# Patient Record
Sex: FEMALE | Race: WHITE | NOT HISPANIC OR LATINO
[De-identification: names, ages, dates, MRNs, and addresses within clinical notes are randomized per-mention and may not be internally consistent; named-entity substitution may affect disease eponyms.]

---

## 2017-01-19 ENCOUNTER — LABORATORY RESULT (OUTPATIENT)
Age: 57
End: 2017-01-19

## 2017-01-19 ENCOUNTER — APPOINTMENT (OUTPATIENT)
Dept: RHEUMATOLOGY | Facility: CLINIC | Age: 57
End: 2017-01-19

## 2017-01-20 ENCOUNTER — RX RENEWAL (OUTPATIENT)
Age: 57
End: 2017-01-20

## 2017-01-24 ENCOUNTER — APPOINTMENT (OUTPATIENT)
Dept: NEUROSURGERY | Facility: CLINIC | Age: 57
End: 2017-01-24

## 2017-01-26 ENCOUNTER — RX RENEWAL (OUTPATIENT)
Age: 57
End: 2017-01-26

## 2017-01-31 ENCOUNTER — APPOINTMENT (OUTPATIENT)
Dept: NEUROSURGERY | Facility: CLINIC | Age: 57
End: 2017-01-31

## 2017-01-31 VITALS
HEIGHT: 60 IN | SYSTOLIC BLOOD PRESSURE: 124 MMHG | WEIGHT: 136 LBS | BODY MASS INDEX: 26.7 KG/M2 | HEART RATE: 74 BPM | DIASTOLIC BLOOD PRESSURE: 77 MMHG

## 2017-02-02 ENCOUNTER — APPOINTMENT (OUTPATIENT)
Dept: RHEUMATOLOGY | Facility: CLINIC | Age: 57
End: 2017-02-02

## 2017-02-16 ENCOUNTER — RX RENEWAL (OUTPATIENT)
Age: 57
End: 2017-02-16

## 2017-03-02 ENCOUNTER — APPOINTMENT (OUTPATIENT)
Dept: RHEUMATOLOGY | Facility: CLINIC | Age: 57
End: 2017-03-02

## 2017-03-09 ENCOUNTER — RX RENEWAL (OUTPATIENT)
Age: 57
End: 2017-03-09

## 2017-03-27 ENCOUNTER — APPOINTMENT (OUTPATIENT)
Dept: RHEUMATOLOGY | Facility: CLINIC | Age: 57
End: 2017-03-27

## 2017-03-27 ENCOUNTER — LABORATORY RESULT (OUTPATIENT)
Age: 57
End: 2017-03-27

## 2017-03-27 VITALS
SYSTOLIC BLOOD PRESSURE: 108 MMHG | DIASTOLIC BLOOD PRESSURE: 77 MMHG | HEIGHT: 60 IN | TEMPERATURE: 98 F | OXYGEN SATURATION: 98 % | HEART RATE: 81 BPM | BODY MASS INDEX: 27.09 KG/M2 | WEIGHT: 138 LBS

## 2017-03-27 LAB
ALBUMIN SERPL ELPH-MCNC: 4.6 G/DL
ALP BLD-CCNC: 63 U/L
ALT SERPL-CCNC: 14 U/L
ANION GAP SERPL CALC-SCNC: 17 MMOL/L
APPEARANCE: CLEAR
AST SERPL-CCNC: 17 U/L
BASOPHILS # BLD AUTO: 0.02 K/UL
BASOPHILS NFR BLD AUTO: 0.3 %
BILIRUB SERPL-MCNC: 0.4 MG/DL
BILIRUBIN URINE: NEGATIVE
BLOOD URINE: ABNORMAL
BUN SERPL-MCNC: 16 MG/DL
CALCIUM SERPL-MCNC: 9.8 MG/DL
CHLORIDE SERPL-SCNC: 100 MMOL/L
CO2 SERPL-SCNC: 23 MMOL/L
COLOR: YELLOW
CREAT SERPL-MCNC: 0.89 MG/DL
CRP SERPL-MCNC: <0.2 MG/DL
EOSINOPHIL # BLD AUTO: 0.08 K/UL
EOSINOPHIL NFR BLD AUTO: 1.2 %
ERYTHROCYTE [SEDIMENTATION RATE] IN BLOOD BY WESTERGREN METHOD: 5 MM/HR
FOLATE SERPL-MCNC: >20 NG/ML
GLUCOSE QUALITATIVE U: NORMAL MG/DL
GLUCOSE SERPL-MCNC: 84 MG/DL
HCT VFR BLD CALC: 42.1 %
HGB BLD-MCNC: 14.6 G/DL
IMM GRANULOCYTES NFR BLD AUTO: 0.1 %
KETONES URINE: NEGATIVE
LEUKOCYTE ESTERASE URINE: ABNORMAL
LYMPHOCYTES # BLD AUTO: 3.3 K/UL
LYMPHOCYTES NFR BLD AUTO: 48.2 %
MAN DIFF?: NORMAL
MCHC RBC-ENTMCNC: 30.2 PG
MCHC RBC-ENTMCNC: 34.7 GM/DL
MCV RBC AUTO: 87.2 FL
MONOCYTES # BLD AUTO: 0.45 K/UL
MONOCYTES NFR BLD AUTO: 6.6 %
NEUTROPHILS # BLD AUTO: 2.98 K/UL
NEUTROPHILS NFR BLD AUTO: 43.6 %
NITRITE URINE: NEGATIVE
PH URINE: 7
PLATELET # BLD AUTO: 249 K/UL
POTASSIUM SERPL-SCNC: 4.4 MMOL/L
PROT SERPL-MCNC: 7.6 G/DL
PROTEIN URINE: NEGATIVE MG/DL
RBC # BLD: 4.83 M/UL
RBC # FLD: 12.5 %
SODIUM SERPL-SCNC: 140 MMOL/L
SPECIFIC GRAVITY URINE: 1.02
UROBILINOGEN URINE: NORMAL MG/DL
VIT B12 SERPL-MCNC: 390 PG/ML
WBC # FLD AUTO: 6.84 K/UL

## 2017-03-27 RX ORDER — METHYLPRED ACET/NACL,ISO-OS/PF 40 MG/ML
40 VIAL (ML) INJECTION
Qty: 1 | Refills: 0 | Status: COMPLETED | OUTPATIENT
Start: 2017-03-27

## 2017-03-27 RX ADMIN — METHYLPREDNISOLONE ACETATE 0 MG/ML: 40 INJECTION, SUSPENSION INTRA-ARTICULAR; INTRALESIONAL; INTRAMUSCULAR; SOFT TISSUE at 00:00

## 2017-03-31 ENCOUNTER — APPOINTMENT (OUTPATIENT)
Dept: ORTHOPEDIC SURGERY | Facility: CLINIC | Age: 57
End: 2017-03-31

## 2017-04-21 ENCOUNTER — APPOINTMENT (OUTPATIENT)
Dept: ORTHOPEDIC SURGERY | Facility: CLINIC | Age: 57
End: 2017-04-21

## 2017-04-27 ENCOUNTER — APPOINTMENT (OUTPATIENT)
Dept: RHEUMATOLOGY | Facility: CLINIC | Age: 57
End: 2017-04-27

## 2017-04-27 ENCOUNTER — RESULT REVIEW (OUTPATIENT)
Age: 57
End: 2017-04-27

## 2017-05-15 ENCOUNTER — APPOINTMENT (OUTPATIENT)
Dept: ORTHOPEDIC SURGERY | Facility: CLINIC | Age: 57
End: 2017-05-15

## 2017-05-22 ENCOUNTER — APPOINTMENT (OUTPATIENT)
Dept: RHEUMATOLOGY | Facility: CLINIC | Age: 57
End: 2017-05-22

## 2017-05-22 VITALS
DIASTOLIC BLOOD PRESSURE: 76 MMHG | TEMPERATURE: 97.9 F | BODY MASS INDEX: 27.09 KG/M2 | WEIGHT: 138 LBS | SYSTOLIC BLOOD PRESSURE: 114 MMHG | OXYGEN SATURATION: 98 % | HEIGHT: 60 IN | HEART RATE: 77 BPM

## 2017-05-24 ENCOUNTER — RX RENEWAL (OUTPATIENT)
Age: 57
End: 2017-05-24

## 2017-05-27 ENCOUNTER — APPOINTMENT (OUTPATIENT)
Dept: MRI IMAGING | Facility: CLINIC | Age: 57
End: 2017-05-27

## 2017-05-27 ENCOUNTER — OUTPATIENT (OUTPATIENT)
Dept: OUTPATIENT SERVICES | Facility: HOSPITAL | Age: 57
LOS: 1 days | End: 2017-05-27
Payer: COMMERCIAL

## 2017-05-27 DIAGNOSIS — K46.9 UNSPECIFIED ABDOMINAL HERNIA WITHOUT OBSTRUCTION OR GANGRENE: Chronic | ICD-10-CM

## 2017-05-27 DIAGNOSIS — D33.2 BENIGN NEOPLASM OF BRAIN, UNSPECIFIED: ICD-10-CM

## 2017-05-27 PROCEDURE — A9585: CPT

## 2017-05-27 PROCEDURE — 70553 MRI BRAIN STEM W/O & W/DYE: CPT

## 2017-06-04 ENCOUNTER — RX RENEWAL (OUTPATIENT)
Age: 57
End: 2017-06-04

## 2017-06-06 ENCOUNTER — APPOINTMENT (OUTPATIENT)
Dept: OTOLARYNGOLOGY | Facility: CLINIC | Age: 57
End: 2017-06-06

## 2017-06-29 ENCOUNTER — RX RENEWAL (OUTPATIENT)
Age: 57
End: 2017-06-29

## 2017-07-09 ENCOUNTER — RX RENEWAL (OUTPATIENT)
Age: 57
End: 2017-07-09

## 2017-07-13 ENCOUNTER — RX RENEWAL (OUTPATIENT)
Age: 57
End: 2017-07-13

## 2017-07-21 ENCOUNTER — APPOINTMENT (OUTPATIENT)
Dept: ORTHOPEDIC SURGERY | Facility: CLINIC | Age: 57
End: 2017-07-21

## 2017-07-21 VITALS
BODY MASS INDEX: 27.48 KG/M2 | DIASTOLIC BLOOD PRESSURE: 77 MMHG | SYSTOLIC BLOOD PRESSURE: 112 MMHG | WEIGHT: 140 LBS | HEIGHT: 60 IN | HEART RATE: 83 BPM

## 2017-07-21 DIAGNOSIS — S90.122A CONTUSION OF LEFT LESSER TOE(S) W/OUT DAMAGE TO NAIL, INITIAL ENCOUNTER: ICD-10-CM

## 2017-07-22 PROBLEM — S90.122A CONTUSION OF LESSER TOE OF LEFT FOOT WITHOUT DAMAGE TO NAIL, INITIAL ENCOUNTER: Status: ACTIVE | Noted: 2017-07-22

## 2017-08-08 ENCOUNTER — APPOINTMENT (OUTPATIENT)
Dept: NEUROSURGERY | Facility: CLINIC | Age: 57
End: 2017-08-08

## 2017-08-21 ENCOUNTER — APPOINTMENT (OUTPATIENT)
Dept: RHEUMATOLOGY | Facility: CLINIC | Age: 57
End: 2017-08-21

## 2017-08-31 ENCOUNTER — APPOINTMENT (OUTPATIENT)
Dept: OTOLARYNGOLOGY | Facility: CLINIC | Age: 57
End: 2017-08-31
Payer: COMMERCIAL

## 2017-08-31 VITALS
WEIGHT: 140 LBS | SYSTOLIC BLOOD PRESSURE: 117 MMHG | HEART RATE: 76 BPM | HEIGHT: 60 IN | DIASTOLIC BLOOD PRESSURE: 79 MMHG | BODY MASS INDEX: 27.48 KG/M2

## 2017-08-31 PROCEDURE — 31575 DIAGNOSTIC LARYNGOSCOPY: CPT

## 2017-08-31 PROCEDURE — 99214 OFFICE O/P EST MOD 30 MIN: CPT | Mod: 25

## 2017-09-01 ENCOUNTER — APPOINTMENT (OUTPATIENT)
Dept: ORTHOPEDIC SURGERY | Facility: CLINIC | Age: 57
End: 2017-09-01

## 2017-09-22 ENCOUNTER — APPOINTMENT (OUTPATIENT)
Dept: SPEECH THERAPY | Facility: CLINIC | Age: 57
End: 2017-09-22

## 2017-09-22 ENCOUNTER — OUTPATIENT (OUTPATIENT)
Dept: OUTPATIENT SERVICES | Facility: HOSPITAL | Age: 57
LOS: 1 days | Discharge: HOME | End: 2017-09-22

## 2017-09-22 DIAGNOSIS — K46.9 UNSPECIFIED ABDOMINAL HERNIA WITHOUT OBSTRUCTION OR GANGRENE: Chronic | ICD-10-CM

## 2017-10-03 ENCOUNTER — APPOINTMENT (OUTPATIENT)
Dept: SPEECH THERAPY | Facility: CLINIC | Age: 57
End: 2017-10-03

## 2017-10-07 ENCOUNTER — RX RENEWAL (OUTPATIENT)
Age: 57
End: 2017-10-07

## 2017-10-10 ENCOUNTER — APPOINTMENT (OUTPATIENT)
Dept: SPEECH THERAPY | Facility: CLINIC | Age: 57
End: 2017-10-10

## 2017-10-16 ENCOUNTER — APPOINTMENT (OUTPATIENT)
Dept: OTOLARYNGOLOGY | Facility: CLINIC | Age: 57
End: 2017-10-16
Payer: COMMERCIAL

## 2017-10-16 VITALS
SYSTOLIC BLOOD PRESSURE: 119 MMHG | WEIGHT: 140 LBS | HEART RATE: 73 BPM | HEIGHT: 60 IN | BODY MASS INDEX: 27.48 KG/M2 | DIASTOLIC BLOOD PRESSURE: 79 MMHG

## 2017-10-16 DIAGNOSIS — Z78.9 OTHER SPECIFIED HEALTH STATUS: ICD-10-CM

## 2017-10-16 DIAGNOSIS — R49.0 DYSPHONIA: ICD-10-CM

## 2017-10-16 DIAGNOSIS — R05 COUGH: ICD-10-CM

## 2017-10-16 PROCEDURE — 99214 OFFICE O/P EST MOD 30 MIN: CPT | Mod: 25

## 2017-10-16 PROCEDURE — 31579 LARYNGOSCOPY TELESCOPIC: CPT

## 2017-10-17 ENCOUNTER — APPOINTMENT (OUTPATIENT)
Dept: SPEECH THERAPY | Facility: CLINIC | Age: 57
End: 2017-10-17

## 2017-10-24 ENCOUNTER — APPOINTMENT (OUTPATIENT)
Dept: SPEECH THERAPY | Facility: CLINIC | Age: 57
End: 2017-10-24

## 2017-11-02 ENCOUNTER — RX RENEWAL (OUTPATIENT)
Age: 57
End: 2017-11-02

## 2017-11-02 DIAGNOSIS — R49.0 DYSPHONIA: ICD-10-CM

## 2017-11-07 ENCOUNTER — APPOINTMENT (OUTPATIENT)
Dept: SPEECH THERAPY | Facility: CLINIC | Age: 57
End: 2017-11-07

## 2017-11-20 ENCOUNTER — LABORATORY RESULT (OUTPATIENT)
Age: 57
End: 2017-11-20

## 2017-11-20 ENCOUNTER — APPOINTMENT (OUTPATIENT)
Dept: RHEUMATOLOGY | Facility: CLINIC | Age: 57
End: 2017-11-20
Payer: COMMERCIAL

## 2017-11-20 ENCOUNTER — APPOINTMENT (OUTPATIENT)
Dept: ENDOCRINOLOGY | Facility: CLINIC | Age: 57
End: 2017-11-20
Payer: COMMERCIAL

## 2017-11-20 VITALS — BODY MASS INDEX: 26.97 KG/M2 | HEIGHT: 60.5 IN | WEIGHT: 141 LBS

## 2017-11-20 PROCEDURE — 77080 DXA BONE DENSITY AXIAL: CPT

## 2017-11-20 PROCEDURE — 99214 OFFICE O/P EST MOD 30 MIN: CPT | Mod: 25

## 2017-11-20 PROCEDURE — 20610 DRAIN/INJ JOINT/BURSA W/O US: CPT | Mod: LT

## 2017-11-20 RX ORDER — TIZANIDINE HYDROCHLORIDE 4 MG/1
4 CAPSULE ORAL
Refills: 0 | Status: DISCONTINUED | COMMUNITY
End: 2017-11-20

## 2017-11-20 RX ORDER — METHYLPRED ACET/NACL,ISO-OS/PF 40 MG/ML
40 VIAL (ML) INJECTION
Qty: 1 | Refills: 0 | Status: COMPLETED | OUTPATIENT
Start: 2017-11-20

## 2017-11-20 RX ADMIN — METHYLPREDNISOLONE ACETATE 0 MG/ML: 40 INJECTION, SUSPENSION INTRA-ARTICULAR; INTRALESIONAL; INTRAMUSCULAR; SOFT TISSUE at 00:00

## 2017-11-21 ENCOUNTER — APPOINTMENT (OUTPATIENT)
Dept: SPEECH THERAPY | Facility: CLINIC | Age: 57
End: 2017-11-21

## 2017-11-24 LAB
25(OH)D3 SERPL-MCNC: 42.2 NG/ML
ADJUSTED MITOGEN: >10 IU/ML
ADJUSTED TB AG: 0.02 IU/ML
ALBUMIN SERPL ELPH-MCNC: 4.3 G/DL
ALP BLD-CCNC: 64 U/L
ALT SERPL-CCNC: 12 U/L
ANION GAP SERPL CALC-SCNC: 14 MMOL/L
APPEARANCE: ABNORMAL
AST SERPL-CCNC: 16 U/L
BASOPHILS # BLD AUTO: 0.03 K/UL
BASOPHILS NFR BLD AUTO: 0.5 %
BILIRUB SERPL-MCNC: 0.3 MG/DL
BILIRUBIN URINE: NEGATIVE
BLOOD URINE: ABNORMAL
BUN SERPL-MCNC: 15 MG/DL
CALCIUM SERPL-MCNC: 9.5 MG/DL
CALCIUM SERPL-MCNC: 9.5 MG/DL
CHLORIDE SERPL-SCNC: 102 MMOL/L
CHOLEST SERPL-MCNC: 203 MG/DL
CHOLEST/HDLC SERPL: 3.4 RATIO
CO2 SERPL-SCNC: 25 MMOL/L
COLOR: YELLOW
CREAT SERPL-MCNC: 0.83 MG/DL
CRP SERPL-MCNC: <0.2 MG/DL
EOSINOPHIL # BLD AUTO: 0.08 K/UL
EOSINOPHIL NFR BLD AUTO: 1.4 %
ERYTHROCYTE [SEDIMENTATION RATE] IN BLOOD BY WESTERGREN METHOD: 5 MM/HR
GLUCOSE QUALITATIVE U: NEGATIVE MG/DL
GLUCOSE SERPL-MCNC: 80 MG/DL
HBV SURFACE AB SER QL: NONREACTIVE
HBV SURFACE AB SERPL IA-ACNC: <3 MIU/ML
HBV SURFACE AG SER QL: NONREACTIVE
HCT VFR BLD CALC: 40.2 %
HCV AB SER QL: NONREACTIVE
HCV S/CO RATIO: 0.14 S/CO
HDLC SERPL-MCNC: 60 MG/DL
HGB BLD-MCNC: 14 G/DL
IMM GRANULOCYTES NFR BLD AUTO: 0.2 %
KETONES URINE: NEGATIVE
LDLC SERPL CALC-MCNC: 123 MG/DL
LEUKOCYTE ESTERASE URINE: NEGATIVE
LYMPHOCYTES # BLD AUTO: 3.01 K/UL
LYMPHOCYTES NFR BLD AUTO: 52.5 %
M TB IFN-G BLD-IMP: NEGATIVE
MAN DIFF?: NORMAL
MCHC RBC-ENTMCNC: 29.9 PG
MCHC RBC-ENTMCNC: 34.8 GM/DL
MCV RBC AUTO: 85.9 FL
MONOCYTES # BLD AUTO: 0.35 K/UL
MONOCYTES NFR BLD AUTO: 6.1 %
NEUTROPHILS # BLD AUTO: 2.25 K/UL
NEUTROPHILS NFR BLD AUTO: 39.3 %
NITRITE URINE: NEGATIVE
PARATHYROID HORMONE INTACT: 35 PG/ML
PH URINE: 7.5
PLATELET # BLD AUTO: 191 K/UL
POTASSIUM SERPL-SCNC: 3.9 MMOL/L
PROT SERPL-MCNC: 7.3 G/DL
PROTEIN URINE: NEGATIVE MG/DL
QUANTIFERON GOLD NIL: 0.06 IU/ML
RBC # BLD: 4.68 M/UL
RBC # FLD: 12.7 %
SODIUM SERPL-SCNC: 141 MMOL/L
SPECIFIC GRAVITY URINE: 1.02
TRIGL SERPL-MCNC: 100 MG/DL
TSH SERPL-ACNC: 0.71 UIU/ML
UROBILINOGEN URINE: NEGATIVE MG/DL
WBC # FLD AUTO: 5.73 K/UL

## 2017-11-30 RX ORDER — TIZANIDINE 4 MG/1
4 TABLET ORAL
Qty: 180 | Refills: 2 | Status: DISCONTINUED | COMMUNITY
Start: 2017-03-27 | End: 2017-11-30

## 2017-12-04 RX ORDER — GOLIMUMAB 100 MG/ML
100 INJECTION, SOLUTION SUBCUTANEOUS
Qty: 1 | Refills: 0 | Status: DISCONTINUED | COMMUNITY
Start: 2017-03-06 | End: 2017-12-04

## 2017-12-05 ENCOUNTER — APPOINTMENT (OUTPATIENT)
Dept: SPEECH THERAPY | Facility: CLINIC | Age: 57
End: 2017-12-05

## 2017-12-09 ENCOUNTER — APPOINTMENT (OUTPATIENT)
Dept: MRI IMAGING | Facility: CLINIC | Age: 57
End: 2017-12-09

## 2018-01-04 ENCOUNTER — APPOINTMENT (OUTPATIENT)
Dept: OTOLARYNGOLOGY | Facility: CLINIC | Age: 58
End: 2018-01-04

## 2018-02-15 ENCOUNTER — OTHER (OUTPATIENT)
Age: 58
End: 2018-02-15

## 2018-02-16 ENCOUNTER — RX RENEWAL (OUTPATIENT)
Age: 58
End: 2018-02-16

## 2018-02-19 ENCOUNTER — RX RENEWAL (OUTPATIENT)
Age: 58
End: 2018-02-19

## 2018-02-26 ENCOUNTER — APPOINTMENT (OUTPATIENT)
Dept: INTERNAL MEDICINE | Facility: CLINIC | Age: 58
End: 2018-02-26

## 2018-03-02 ENCOUNTER — FORM ENCOUNTER (OUTPATIENT)
Age: 58
End: 2018-03-02

## 2018-03-03 ENCOUNTER — APPOINTMENT (OUTPATIENT)
Dept: MRI IMAGING | Facility: CLINIC | Age: 58
End: 2018-03-03
Payer: COMMERCIAL

## 2018-03-03 ENCOUNTER — OUTPATIENT (OUTPATIENT)
Dept: OUTPATIENT SERVICES | Facility: HOSPITAL | Age: 58
LOS: 1 days | End: 2018-03-03
Payer: COMMERCIAL

## 2018-03-03 DIAGNOSIS — K46.9 UNSPECIFIED ABDOMINAL HERNIA WITHOUT OBSTRUCTION OR GANGRENE: Chronic | ICD-10-CM

## 2018-03-03 DIAGNOSIS — L40.50 ARTHROPATHIC PSORIASIS, UNSPECIFIED: ICD-10-CM

## 2018-03-03 PROCEDURE — 73721 MRI JNT OF LWR EXTRE W/O DYE: CPT | Mod: 26,LT

## 2018-03-03 PROCEDURE — 73721 MRI JNT OF LWR EXTRE W/O DYE: CPT

## 2018-03-03 PROCEDURE — A9585: CPT

## 2018-03-03 PROCEDURE — 70553 MRI BRAIN STEM W/O & W/DYE: CPT | Mod: 26

## 2018-03-03 PROCEDURE — 70553 MRI BRAIN STEM W/O & W/DYE: CPT

## 2018-03-06 ENCOUNTER — APPOINTMENT (OUTPATIENT)
Dept: NEUROSURGERY | Facility: CLINIC | Age: 58
End: 2018-03-06
Payer: COMMERCIAL

## 2018-03-06 VITALS
WEIGHT: 142 LBS | HEIGHT: 60.5 IN | BODY MASS INDEX: 27.16 KG/M2 | HEART RATE: 77 BPM | DIASTOLIC BLOOD PRESSURE: 87 MMHG | SYSTOLIC BLOOD PRESSURE: 130 MMHG

## 2018-03-06 PROCEDURE — 99212 OFFICE O/P EST SF 10 MIN: CPT

## 2018-03-12 ENCOUNTER — APPOINTMENT (OUTPATIENT)
Dept: RHEUMATOLOGY | Facility: CLINIC | Age: 58
End: 2018-03-12
Payer: COMMERCIAL

## 2018-03-14 ENCOUNTER — APPOINTMENT (OUTPATIENT)
Dept: RHEUMATOLOGY | Facility: CLINIC | Age: 58
End: 2018-03-14
Payer: COMMERCIAL

## 2018-03-14 DIAGNOSIS — Z87.39 PERSONAL HISTORY OF OTHER DISEASES OF THE MUSCULOSKELETAL SYSTEM AND CONNECTIVE TISSUE: ICD-10-CM

## 2018-03-14 PROCEDURE — 99215 OFFICE O/P EST HI 40 MIN: CPT

## 2018-03-14 RX ORDER — DICLOFENAC EPOLAMINE 0.01 G/1
1.3 SYSTEM TOPICAL TWICE DAILY
Qty: 60 | Refills: 2 | Status: DISCONTINUED | COMMUNITY
Start: 2017-03-27 | End: 2018-03-14

## 2018-03-14 RX ORDER — CLOTRIMAZOLE AND BETAMETHASONE DIPROPIONATE 10; .5 MG/G; MG/G
1-0.05 CREAM TOPICAL
Qty: 45 | Refills: 0 | Status: DISCONTINUED | COMMUNITY
Start: 2017-03-21 | End: 2018-03-14

## 2018-03-14 RX ORDER — FOLIC ACID 20 MG
CAPSULE ORAL
Refills: 0 | Status: DISCONTINUED | COMMUNITY
End: 2018-03-14

## 2018-03-14 RX ORDER — CIPROFLOXACIN HYDROCHLORIDE 500 MG/1
500 TABLET, FILM COATED ORAL
Qty: 6 | Refills: 0 | Status: DISCONTINUED | COMMUNITY
Start: 2017-06-13 | End: 2018-03-14

## 2018-03-14 RX ORDER — LEFLUNOMIDE 20 MG/1
20 TABLET, FILM COATED ORAL
Qty: 90 | Refills: 1 | Status: DISCONTINUED | COMMUNITY
Start: 2017-11-20 | End: 2018-03-14

## 2018-03-14 RX ORDER — LORATADINE 10 MG/1
10 TABLET ORAL
Qty: 20 | Refills: 0 | Status: DISCONTINUED | COMMUNITY
Start: 2017-07-17 | End: 2018-03-14

## 2018-03-14 RX ORDER — CYCLOBENZAPRINE HYDROCHLORIDE 10 MG/1
10 TABLET, FILM COATED ORAL
Qty: 60 | Refills: 0 | Status: DISCONTINUED | COMMUNITY
Start: 2017-03-21 | End: 2018-03-14

## 2018-03-14 RX ORDER — POLY-UREAURETHANE 16 %
NAIL FILM SOLUTION (ML) TOPICAL
Qty: 15 | Refills: 0 | Status: DISCONTINUED | COMMUNITY
Start: 2017-05-16 | End: 2018-03-14

## 2018-03-14 RX ORDER — LIDOCAINE AND PRILOCAINE 25; 25 MG/G; MG/G
2.5-2.5 CREAM TOPICAL
Refills: 0 | Status: DISCONTINUED | COMMUNITY
End: 2018-03-14

## 2018-03-14 RX ORDER — AMOXICILLIN 500 MG/1
500 CAPSULE ORAL
Qty: 21 | Refills: 0 | Status: DISCONTINUED | COMMUNITY
Start: 2017-02-12 | End: 2018-03-14

## 2018-03-16 ENCOUNTER — OTHER (OUTPATIENT)
Age: 58
End: 2018-03-16

## 2018-03-23 ENCOUNTER — RX RENEWAL (OUTPATIENT)
Age: 58
End: 2018-03-23

## 2018-03-27 ENCOUNTER — APPOINTMENT (OUTPATIENT)
Dept: MRI IMAGING | Facility: CLINIC | Age: 58
End: 2018-03-27
Payer: COMMERCIAL

## 2018-03-27 ENCOUNTER — OUTPATIENT (OUTPATIENT)
Dept: OUTPATIENT SERVICES | Facility: HOSPITAL | Age: 58
LOS: 1 days | End: 2018-03-27
Payer: COMMERCIAL

## 2018-03-27 ENCOUNTER — APPOINTMENT (OUTPATIENT)
Dept: RADIOLOGY | Facility: CLINIC | Age: 58
End: 2018-03-27
Payer: COMMERCIAL

## 2018-03-27 DIAGNOSIS — K46.9 UNSPECIFIED ABDOMINAL HERNIA WITHOUT OBSTRUCTION OR GANGRENE: Chronic | ICD-10-CM

## 2018-03-27 DIAGNOSIS — Z00.8 ENCOUNTER FOR OTHER GENERAL EXAMINATION: ICD-10-CM

## 2018-03-27 PROCEDURE — 72100 X-RAY EXAM L-S SPINE 2/3 VWS: CPT | Mod: 26

## 2018-03-27 PROCEDURE — 72148 MRI LUMBAR SPINE W/O DYE: CPT | Mod: 26

## 2018-03-27 PROCEDURE — 72100 X-RAY EXAM L-S SPINE 2/3 VWS: CPT

## 2018-03-27 PROCEDURE — 73522 X-RAY EXAM HIPS BI 3-4 VIEWS: CPT

## 2018-03-27 PROCEDURE — 72148 MRI LUMBAR SPINE W/O DYE: CPT

## 2018-03-27 PROCEDURE — 73522 X-RAY EXAM HIPS BI 3-4 VIEWS: CPT | Mod: 26

## 2018-04-12 ENCOUNTER — APPOINTMENT (OUTPATIENT)
Dept: ORTHOPEDIC SURGERY | Facility: CLINIC | Age: 58
End: 2018-04-12
Payer: COMMERCIAL

## 2018-04-12 VITALS
HEIGHT: 60 IN | SYSTOLIC BLOOD PRESSURE: 128 MMHG | DIASTOLIC BLOOD PRESSURE: 80 MMHG | BODY MASS INDEX: 27.48 KG/M2 | WEIGHT: 140 LBS | HEART RATE: 75 BPM

## 2018-04-12 DIAGNOSIS — M47.817 SPONDYLOSIS W/OUT MYELOPATHY OR RADICULOPATHY, LUMBOSACRAL REGION: ICD-10-CM

## 2018-04-12 PROCEDURE — 73562 X-RAY EXAM OF KNEE 3: CPT | Mod: RT

## 2018-04-12 PROCEDURE — 72100 X-RAY EXAM L-S SPINE 2/3 VWS: CPT

## 2018-04-12 PROCEDURE — 99214 OFFICE O/P EST MOD 30 MIN: CPT

## 2018-04-12 PROCEDURE — 72170 X-RAY EXAM OF PELVIS: CPT

## 2018-04-24 ENCOUNTER — APPOINTMENT (OUTPATIENT)
Dept: OTOLARYNGOLOGY | Facility: CLINIC | Age: 58
End: 2018-04-24
Payer: COMMERCIAL

## 2018-04-24 VITALS
HEART RATE: 71 BPM | BODY MASS INDEX: 27.48 KG/M2 | SYSTOLIC BLOOD PRESSURE: 123 MMHG | WEIGHT: 140 LBS | DIASTOLIC BLOOD PRESSURE: 78 MMHG | HEIGHT: 60 IN

## 2018-04-24 DIAGNOSIS — H61.893 OTHER SPECIFIED DISORDERS OF EXTERNAL EAR, BILATERAL: ICD-10-CM

## 2018-04-24 DIAGNOSIS — J38.3 OTHER DISEASES OF VOCAL CORDS: ICD-10-CM

## 2018-04-24 PROCEDURE — 31575 DIAGNOSTIC LARYNGOSCOPY: CPT

## 2018-04-24 PROCEDURE — 99214 OFFICE O/P EST MOD 30 MIN: CPT | Mod: 25

## 2018-04-25 ENCOUNTER — RX RENEWAL (OUTPATIENT)
Age: 58
End: 2018-04-25

## 2018-04-30 ENCOUNTER — APPOINTMENT (OUTPATIENT)
Dept: RHEUMATOLOGY | Facility: CLINIC | Age: 58
End: 2018-04-30

## 2018-05-02 ENCOUNTER — CLINICAL ADVICE (OUTPATIENT)
Age: 58
End: 2018-05-02

## 2018-05-03 ENCOUNTER — CLINICAL ADVICE (OUTPATIENT)
Age: 58
End: 2018-05-03

## 2018-05-03 ENCOUNTER — APPOINTMENT (OUTPATIENT)
Dept: ORTHOPEDIC SURGERY | Facility: CLINIC | Age: 58
End: 2018-05-03
Payer: COMMERCIAL

## 2018-05-03 PROCEDURE — 99214 OFFICE O/P EST MOD 30 MIN: CPT

## 2018-05-11 ENCOUNTER — RX RENEWAL (OUTPATIENT)
Age: 58
End: 2018-05-11

## 2018-05-11 ENCOUNTER — FORM ENCOUNTER (OUTPATIENT)
Age: 58
End: 2018-05-11

## 2018-05-12 ENCOUNTER — OUTPATIENT (OUTPATIENT)
Dept: OUTPATIENT SERVICES | Facility: HOSPITAL | Age: 58
LOS: 1 days | End: 2018-05-12
Payer: COMMERCIAL

## 2018-05-12 ENCOUNTER — APPOINTMENT (OUTPATIENT)
Dept: CT IMAGING | Facility: CLINIC | Age: 58
End: 2018-05-12
Payer: COMMERCIAL

## 2018-05-12 DIAGNOSIS — K14.6 GLOSSODYNIA: ICD-10-CM

## 2018-05-12 DIAGNOSIS — K46.9 UNSPECIFIED ABDOMINAL HERNIA WITHOUT OBSTRUCTION OR GANGRENE: Chronic | ICD-10-CM

## 2018-05-12 PROCEDURE — 70491 CT SOFT TISSUE NECK W/DYE: CPT | Mod: 26

## 2018-05-12 PROCEDURE — 70491 CT SOFT TISSUE NECK W/DYE: CPT

## 2018-05-30 ENCOUNTER — APPOINTMENT (OUTPATIENT)
Dept: OBGYN | Facility: CLINIC | Age: 58
End: 2018-05-30

## 2018-06-04 ENCOUNTER — APPOINTMENT (OUTPATIENT)
Dept: RHEUMATOLOGY | Facility: CLINIC | Age: 58
End: 2018-06-04

## 2018-06-04 VITALS
WEIGHT: 141 LBS | BODY MASS INDEX: 27.68 KG/M2 | HEART RATE: 70 BPM | SYSTOLIC BLOOD PRESSURE: 119 MMHG | DIASTOLIC BLOOD PRESSURE: 80 MMHG | TEMPERATURE: 98 F | HEIGHT: 60 IN | OXYGEN SATURATION: 98 %

## 2018-06-06 ENCOUNTER — RX RENEWAL (OUTPATIENT)
Age: 58
End: 2018-06-06

## 2018-06-12 ENCOUNTER — APPOINTMENT (OUTPATIENT)
Dept: RHEUMATOLOGY | Facility: CLINIC | Age: 58
End: 2018-06-12

## 2018-07-10 ENCOUNTER — APPOINTMENT (OUTPATIENT)
Dept: ULTRASOUND IMAGING | Facility: IMAGING CENTER | Age: 58
End: 2018-07-10

## 2018-07-10 ENCOUNTER — APPOINTMENT (OUTPATIENT)
Dept: OTOLARYNGOLOGY | Facility: CLINIC | Age: 58
End: 2018-07-10

## 2018-07-21 ENCOUNTER — APPOINTMENT (OUTPATIENT)
Dept: NEUROLOGY | Facility: CLINIC | Age: 58
End: 2018-07-21
Payer: COMMERCIAL

## 2018-07-21 VITALS
HEIGHT: 60 IN | DIASTOLIC BLOOD PRESSURE: 75 MMHG | SYSTOLIC BLOOD PRESSURE: 110 MMHG | BODY MASS INDEX: 27.68 KG/M2 | WEIGHT: 141 LBS | HEART RATE: 79 BPM

## 2018-07-21 PROCEDURE — 99244 OFF/OP CNSLTJ NEW/EST MOD 40: CPT

## 2018-07-21 RX ORDER — GARLIC EXTRACT 500 MG
CAPSULE ORAL
Qty: 30 | Refills: 0 | Status: DISCONTINUED | COMMUNITY
Start: 2018-04-24

## 2018-07-21 RX ORDER — ZINC SULFATE TAB 220 MG (50 MG ZINC EQUIVALENT) 220 (50 ZN) MG
220 (50 ZN) TAB ORAL
Qty: 30 | Refills: 0 | Status: DISCONTINUED | COMMUNITY
Start: 2018-04-25

## 2018-07-22 PROBLEM — R05 NON-PRODUCTIVE COUGH: Status: ACTIVE | Noted: 2017-10-16

## 2018-07-23 ENCOUNTER — APPOINTMENT (OUTPATIENT)
Dept: OPHTHALMOLOGY | Facility: CLINIC | Age: 58
End: 2018-07-23

## 2018-07-24 ENCOUNTER — RX RENEWAL (OUTPATIENT)
Age: 58
End: 2018-07-24

## 2018-07-26 ENCOUNTER — APPOINTMENT (OUTPATIENT)
Dept: RHEUMATOLOGY | Facility: CLINIC | Age: 58
End: 2018-07-26

## 2018-08-31 ENCOUNTER — APPOINTMENT (OUTPATIENT)
Dept: NEUROSURGERY | Facility: CLINIC | Age: 58
End: 2018-08-31

## 2018-09-25 ENCOUNTER — APPOINTMENT (OUTPATIENT)
Dept: OTOLARYNGOLOGY | Facility: CLINIC | Age: 58
End: 2018-09-25

## 2018-12-31 ENCOUNTER — OUTPATIENT (OUTPATIENT)
Dept: OUTPATIENT SERVICES | Facility: HOSPITAL | Age: 58
LOS: 1 days | End: 2018-12-31
Payer: COMMERCIAL

## 2018-12-31 ENCOUNTER — APPOINTMENT (OUTPATIENT)
Dept: ULTRASOUND IMAGING | Facility: CLINIC | Age: 58
End: 2018-12-31
Payer: COMMERCIAL

## 2018-12-31 DIAGNOSIS — K46.9 UNSPECIFIED ABDOMINAL HERNIA WITHOUT OBSTRUCTION OR GANGRENE: Chronic | ICD-10-CM

## 2018-12-31 DIAGNOSIS — R10.9 UNSPECIFIED ABDOMINAL PAIN: ICD-10-CM

## 2018-12-31 PROCEDURE — 76700 US EXAM ABDOM COMPLETE: CPT | Mod: 26

## 2018-12-31 PROCEDURE — 76700 US EXAM ABDOM COMPLETE: CPT

## 2019-01-25 ENCOUNTER — OTHER (OUTPATIENT)
Age: 59
End: 2019-01-25

## 2019-02-09 ENCOUNTER — APPOINTMENT (OUTPATIENT)
Dept: RADIOLOGY | Facility: CLINIC | Age: 59
End: 2019-02-09
Payer: COMMERCIAL

## 2019-02-09 ENCOUNTER — OUTPATIENT (OUTPATIENT)
Dept: OUTPATIENT SERVICES | Facility: HOSPITAL | Age: 59
LOS: 1 days | End: 2019-02-09
Payer: COMMERCIAL

## 2019-02-09 DIAGNOSIS — K46.9 UNSPECIFIED ABDOMINAL HERNIA WITHOUT OBSTRUCTION OR GANGRENE: Chronic | ICD-10-CM

## 2019-02-09 DIAGNOSIS — L40.50 ARTHROPATHIC PSORIASIS, UNSPECIFIED: ICD-10-CM

## 2019-02-09 PROCEDURE — 72110 X-RAY EXAM L-2 SPINE 4/>VWS: CPT | Mod: 26

## 2019-02-09 PROCEDURE — 73562 X-RAY EXAM OF KNEE 3: CPT | Mod: 26,50

## 2019-02-09 PROCEDURE — 72202 X-RAY EXAM SI JOINTS 3/> VWS: CPT | Mod: 26

## 2019-02-09 PROCEDURE — 72202 X-RAY EXAM SI JOINTS 3/> VWS: CPT

## 2019-02-09 PROCEDURE — 73562 X-RAY EXAM OF KNEE 3: CPT

## 2019-02-09 PROCEDURE — 72110 X-RAY EXAM L-2 SPINE 4/>VWS: CPT

## 2019-02-13 ENCOUNTER — OUTPATIENT (OUTPATIENT)
Dept: OUTPATIENT SERVICES | Facility: HOSPITAL | Age: 59
LOS: 1 days | End: 2019-02-13
Payer: COMMERCIAL

## 2019-02-13 ENCOUNTER — APPOINTMENT (OUTPATIENT)
Dept: MRI IMAGING | Facility: CLINIC | Age: 59
End: 2019-02-13
Payer: COMMERCIAL

## 2019-02-13 DIAGNOSIS — M17.0 BILATERAL PRIMARY OSTEOARTHRITIS OF KNEE: ICD-10-CM

## 2019-02-13 DIAGNOSIS — K46.9 UNSPECIFIED ABDOMINAL HERNIA WITHOUT OBSTRUCTION OR GANGRENE: Chronic | ICD-10-CM

## 2019-02-13 PROCEDURE — 73721 MRI JNT OF LWR EXTRE W/O DYE: CPT

## 2019-02-13 PROCEDURE — 73721 MRI JNT OF LWR EXTRE W/O DYE: CPT | Mod: 26,RT

## 2019-02-19 ENCOUNTER — APPOINTMENT (OUTPATIENT)
Dept: ORTHOPEDIC SURGERY | Facility: CLINIC | Age: 59
End: 2019-02-19
Payer: COMMERCIAL

## 2019-02-19 VITALS — BODY MASS INDEX: 27.48 KG/M2 | WEIGHT: 140 LBS | HEIGHT: 60 IN

## 2019-02-19 PROCEDURE — 73564 X-RAY EXAM KNEE 4 OR MORE: CPT | Mod: 50

## 2019-02-19 PROCEDURE — 99214 OFFICE O/P EST MOD 30 MIN: CPT

## 2019-02-19 NOTE — DISCUSSION/SUMMARY
[de-identified] : This patient has bilateral knee mild to moderate patellofemoral osteoarthritis with an acute on chronic flare of pain.  The patient is not an appropriate candidate for total knee arthroplasty at this time. An extensive discussion was conducted on the natural history of the disease and the variety of surgical and non-surgical options available to the patient including, but not limited to non-steroidal anti-inflammatory medications, steroid injections, physical therapy, maintenance of ideal body weight, and reduction of activity.  I recommended a course of Celebrex, physical therapy and neoprene sleeve knee braces.  I also gave her a prescription for a cane for offloading.  If she is not better in 4-6 weeks then she will return and we will consider an intra-articular cortisone injection.  The patient will schedule an appointment as needed.

## 2019-02-19 NOTE — PHYSICAL EXAM
[de-identified] : Patient is well nourished, well-developed, in no acute distress, with appropriate mood and affect. The patient is oriented to time, place, and person. Respirations are even and unlabored. Gait evaluation does reveal a limp. There is no inguinal adenopathy. Bilateral limbs are well-perfused, without skin lesions, shows a grossly normal motor and sensory examination. The right knee motion is significantly reduced and does cause significant pain. The right knee moves from 0-130 degrees. The knee is stable within that range-of-motion to AP and ML stress. The alignment of the knee is 5 degrees of valgus.  Positive patellar grind and patellofemoral compression testing.  Muscle strength is normal.  Pedal pulses are palpable. Hip examination was negative. The left knee motion is significantly reduced and does cause significant pain. The left knee moves from 0-130 degrees. The knee is stable within that range-of-motion to AP and ML stress. The alignment of the knee is 5 degrees valgus.  Positive patellar grind and patellofemoral compression testing.  Muscle strength is normal. Pedal pulses are palpable. Hip examination was negative. [de-identified] : Long standing knee, AP knee, lateral knee, and patellar views of the bilateral knee were ordered and taken in the office and demonstrate mild to moderate degenerative joint disease of the knee with joint space narrowing, osteophyte formation, and subchondral sclerosis.

## 2019-02-19 NOTE — HISTORY OF PRESENT ILLNESS
[de-identified] : This is very nice 58-year-old female experiencing bilateral anterior knee pain, which is severe in intensity.  She has had chronic pain for some time and her bilateral knees 2 days ago she twisted her knees while walking up and down the steps that she felt an increase in pain in the knees.  The pain substantially limits activities of daily living. Walking tolerance is reduced.  She denies any catching or clicking in the knees and the knees are not giving way on her.  She is not taking any medications for this.  She is not using a neoprene sleeve knee brace.  She is not using a cane or walker.  She has not doing physical therapy for this.  The pain does not radiate down her legs and it is not associated with numbness or tingling or weakness.

## 2019-02-19 NOTE — REASON FOR VISIT
[Initial Visit] : an initial visit for [Knee Pain] : knee pain [Osteoarthritis, Knee] : osteoarthritis, knee

## 2019-02-21 ENCOUNTER — APPOINTMENT (OUTPATIENT)
Dept: ENDOCRINOLOGY | Facility: CLINIC | Age: 59
End: 2019-02-21
Payer: COMMERCIAL

## 2019-02-21 VITALS
WEIGHT: 145 LBS | BODY MASS INDEX: 28.47 KG/M2 | SYSTOLIC BLOOD PRESSURE: 110 MMHG | OXYGEN SATURATION: 97 % | HEART RATE: 75 BPM | HEIGHT: 60 IN | DIASTOLIC BLOOD PRESSURE: 70 MMHG

## 2019-02-21 DIAGNOSIS — E04.1 NONTOXIC SINGLE THYROID NODULE: ICD-10-CM

## 2019-02-21 PROCEDURE — 77080 DXA BONE DENSITY AXIAL: CPT

## 2019-02-21 PROCEDURE — ZZZZZ: CPT

## 2019-02-21 PROCEDURE — 99244 OFF/OP CNSLTJ NEW/EST MOD 40: CPT | Mod: 25

## 2019-02-21 RX ORDER — SULFAMETHOXAZOLE AND TRIMETHOPRIM 800; 160 MG/1; MG/1
800-160 TABLET ORAL
Qty: 4 | Refills: 0 | Status: COMPLETED | COMMUNITY
Start: 2018-10-17

## 2019-02-21 RX ORDER — MUPIROCIN 20 MG/G
2 OINTMENT TOPICAL
Qty: 22 | Refills: 0 | Status: COMPLETED | COMMUNITY
Start: 2018-11-08

## 2019-02-21 RX ORDER — DENOSUMAB 60 MG/ML
60 INJECTION SUBCUTANEOUS
Qty: 1 | Refills: 0 | Status: DISCONTINUED | COMMUNITY
Start: 2018-04-06 | End: 2019-02-21

## 2019-02-21 RX ORDER — TOBRAMYCIN AND DEXAMETHASONE 3; 1 MG/ML; MG/ML
0.3-0.1 SUSPENSION/ DROPS OPHTHALMIC
Qty: 5 | Refills: 0 | Status: COMPLETED | COMMUNITY
Start: 2018-11-23

## 2019-02-21 RX ORDER — UREA 17 G/85G
20 CREAM TOPICAL
Qty: 85 | Refills: 0 | Status: COMPLETED | COMMUNITY
Start: 2018-10-17

## 2019-02-21 NOTE — REVIEW OF SYSTEMS
[Negative] : Heme/Lymph [Recent Weight Gain (___ Lbs)] : recent [unfilled] ~Ulb weight gain [Joint Pain] : joint pain [Myalgia] : myalgia

## 2019-02-22 NOTE — PHYSICAL EXAM
[Alert] : alert [No Acute Distress] : no acute distress [Well Nourished] : well nourished [Well Developed] : well developed [Normal Sclera/Conjunctiva] : normal sclera/conjunctiva [EOMI] : extra ocular movement intact [No Proptosis] : no proptosis [Normal Oropharynx] : the oropharynx was normal [No Respiratory Distress] : no respiratory distress [No Accessory Muscle Use] : no accessory muscle use [Clear to Auscultation] : lungs were clear to auscultation bilaterally [Normal Rate] : heart rate was normal  [Normal S1, S2] : normal S1 and S2 [Regular Rhythm] : with a regular rhythm [Normal Bowel Sounds] : normal bowel sounds [Not Tender] : non-tender [Soft] : abdomen soft [Not Distended] : not distended [Post Cervical Nodes] : posterior cervical nodes [Anterior Cervical Nodes] : anterior cervical nodes [Axillary Nodes] : axillary nodes [Normal] : normal and non tender [No Spinal Tenderness] : no spinal tenderness [Spine Straight] : spine straight [No Stigmata of Cushings Syndrome] : no stigmata of cushings syndrome [Normal Gait] : normal gait [Normal Strength/Tone] : muscle strength and tone were normal [No Rash] : no rash [Normal Reflexes] : deep tendon reflexes were 2+ and symmetric [No Tremors] : no tremors [Oriented x3] : oriented to person, place, and time [Acanthosis Nigricans] : no acanthosis nigricans [de-identified] : R thyroid nodule ~1 cm

## 2019-02-22 NOTE — CONSULT LETTER
[Consult Letter:] : I had the pleasure of evaluating your patient, [unfilled]. [Please see my note below.] : Please see my note below. [Consult Closing:] : Thank you very much for allowing me to participate in the care of this patient.  If you have any questions, please do not hesitate to contact me. [Sincerely,] : Sincerely, [Dear  ___] : Dear  [unfilled], [DrEli  ___] : Dr. CHAN [FreeTextEntry2] : Erik Iqbal MD\par [address] [place] [address]\[place] [address]

## 2019-02-22 NOTE — PROCEDURE
[FreeTextEntry1] : Bone mineral density: 02/21/2019 \par Indication: vs. 2017 \par Spine: -2.2 osteopenia (+8.0%)\par Total hip: -2.3 osteopenia (+4.9%)\par Femoral neck: -2.6 osteoporosis, no significant change \par Proximal radius: -1.3 osteopenia, no significant change \par

## 2019-02-22 NOTE — HISTORY OF PRESENT ILLNESS
[Prolia (Denosumab)] : Prolia (Denosumab) [Calcium (supplements)] : calcium from a dietary supplement [Taking Steroids] : a history of taking steroids [Family History of Osteoporosis] : family history of osteoporosis [Regular Dental Follow-Up] : regular dental follow-up [FreeTextEntry1] : Ms. NICK is a 58 year old female being referred today for osteoporosis. \par \par Pt was first told of low bone density ~2013. Pt was being treated by PCP. She previously took an oral bisphosphonates and had severe UGI sx. She had endoscopies, no ulcers found. BMD Nov 2017 vs 2014, spine: -2.8, osteoporosis ( -11.4%) Total hip: -2.5 osteoporosis (-9.6%) Fem neck: -2.8 osteoporosis (-6.8%) Proximal wrist: -1.1 osteopenia (-3.4%). Pt was then put on Prolia for 2 doses (last ~June 2018). She states on Prolia she has severe myalgias. No h/o fx. FHx of osteoporosis (mother and sister). Pt had hysterectomy at 42 due to fibroids and went through menopause ~ 52. No HRT. No hot flashes. She gets moderate amounts of dietary Ca and takes Ca. \par \par Of note, pt has fibromyalgia; previously on long term Prednisone for 6 mons in 2018. She then d/c and more recently restarted for her pain. c/o joint pain and pt can not walk well due to knee pain. Pt does not exercise. She states she recently gained wt despite no change in health habits.  \par \par Endocrine hx notable for R thyroid nodule, FNA biopsy benign with Dr. Shahriar Strauss. No dysphagia or neck pain. No sx of hyper or hypothyroidism.  [Low Calcium Intake] : no past or present history of low calcium intake [Low Vit D Intake/Exposure] : no past or present history of low vitamin D intake [High Fall Risk] : no fall risk [Frequent Falls] : no frequent falls [Uses a Walker/Cane] : no use of a walker/cane [Family History of Breast Cancer] : no family history of breast cancer [Family History of Hip Fracture] : no family history of hip fracture [Hyperparathyroidism] : no hyperparathyroidism [History of Radiation Therapy] : no history of radiation therapy [History of Blood Clots] : no history of blood clots [Previous Fragility Fracture] : no previous fragility fracture

## 2019-02-22 NOTE — ASSESSMENT
[Bisphosphonate Therapy] : Risks  and benefits of bisphosphonate therapy were  discussed with the patient including gastroesophageal irritation, osteonecrosis of the jaw, and atypical femur fractures, and acute phase reaction [FreeTextEntry1] : 58 year old female with osteoporosis. \par \par Pt has known of low bone density since ~ 2013. She was previously intolerant to oral bisphosphonates due to UGI sx. BMD 2017 decreased while on drug holiday. Pt then had 2 doses of Prolia (last ~June 2018). She did not tolerate due to severe myalgias. Of note, pt has a h/o fibromyalgia and still c/o severe joint pain and myalgias, it is unclear if her myalgias were truly related to the Prolia. Pt has previously been on long term steroids and more recently restarted Prednisone ~Feb 2019. Pt has no h/o fx. FHX of osteoporosis (sister and mother). Pt does not exercise and states she had wt gain without change in dietary habits. Endocrine hx is notable for thyroid nodule, previously benign on biopsy, pt has no clear sx of hyper or hypothyroidism. \par \par Repeat BMD 2/2019 indicates improvement in spine and tot hip, only fem neck consistent with osteoporosis (likely due to Prolia). Pt is at increase risk for future fx by d/c Prolia. First, I recommended no more than calcium 500 mg per day, vitamin D. 2000 units per day, in addition to dietary intake. I recommend pt start medical treatment to stop bone loss, increase BMD, and thus reduce risk of future fx. Due to GI irritation I would recommend IV Reclast. Risks and benefits discussed to include flu like sx on first dose of Reclast, long term risk of ONJ or atypical femur fx. With Reclast pt would be candidate for a drug holiday <5 years to decrease the future risk of these side effects. Recent research with Reclast looked at 18 month cycles of dosing instead of 12 months, with 6 year safety data, further there was reduction in CA. All questions answered. Pt understands and will call back with a decision. \par \par f/u TBD.

## 2019-02-22 NOTE — END OF VISIT
[FreeTextEntry3] : I, Nyla Ross, authored this note working as a medical scribe for Dr. Stephens.  02/21/2019. 11:00AM. \par This note was authored by Nyla Ross working as medical scribe for me. I have reviewed, edited, and revised the note as needed. I am in agreement with the exam findings, imaging findings, and treatment plan.  Sudeep Stephens MD

## 2019-03-01 ENCOUNTER — OUTPATIENT (OUTPATIENT)
Dept: OUTPATIENT SERVICES | Facility: HOSPITAL | Age: 59
LOS: 1 days | End: 2019-03-01
Payer: COMMERCIAL

## 2019-03-01 ENCOUNTER — FORM ENCOUNTER (OUTPATIENT)
Age: 59
End: 2019-03-01

## 2019-03-01 DIAGNOSIS — K46.9 UNSPECIFIED ABDOMINAL HERNIA WITHOUT OBSTRUCTION OR GANGRENE: Chronic | ICD-10-CM

## 2019-03-01 PROCEDURE — 78227 HEPATOBIL SYST IMAGE W/DRUG: CPT | Mod: 26

## 2019-03-01 PROCEDURE — 78227 HEPATOBIL SYST IMAGE W/DRUG: CPT

## 2019-03-01 PROCEDURE — A9537: CPT

## 2019-03-02 ENCOUNTER — OUTPATIENT (OUTPATIENT)
Dept: OUTPATIENT SERVICES | Facility: HOSPITAL | Age: 59
LOS: 1 days | End: 2019-03-02
Payer: COMMERCIAL

## 2019-03-02 ENCOUNTER — APPOINTMENT (OUTPATIENT)
Dept: MRI IMAGING | Facility: CLINIC | Age: 59
End: 2019-03-02
Payer: COMMERCIAL

## 2019-03-02 DIAGNOSIS — D32.9 BENIGN NEOPLASM OF MENINGES, UNSPECIFIED: ICD-10-CM

## 2019-03-02 DIAGNOSIS — K46.9 UNSPECIFIED ABDOMINAL HERNIA WITHOUT OBSTRUCTION OR GANGRENE: Chronic | ICD-10-CM

## 2019-03-02 PROCEDURE — A9585: CPT

## 2019-03-02 PROCEDURE — 70553 MRI BRAIN STEM W/O & W/DYE: CPT | Mod: 26

## 2019-03-02 PROCEDURE — 70553 MRI BRAIN STEM W/O & W/DYE: CPT

## 2019-03-14 ENCOUNTER — APPOINTMENT (OUTPATIENT)
Dept: ORTHOPEDIC SURGERY | Facility: CLINIC | Age: 59
End: 2019-03-14
Payer: COMMERCIAL

## 2019-03-14 VITALS — DIASTOLIC BLOOD PRESSURE: 78 MMHG | HEART RATE: 80 BPM | SYSTOLIC BLOOD PRESSURE: 122 MMHG

## 2019-03-14 VITALS — HEIGHT: 60 IN | WEIGHT: 143 LBS | BODY MASS INDEX: 28.07 KG/M2

## 2019-03-14 DIAGNOSIS — M23.303 OTHER MENISCUS DERANGEMENTS, UNSPECIFIED MEDIAL MENISCUS, RIGHT KNEE: ICD-10-CM

## 2019-03-14 PROCEDURE — 99213 OFFICE O/P EST LOW 20 MIN: CPT

## 2019-03-14 NOTE — HISTORY OF PRESENT ILLNESS
[Improving] : improving [7] : a current pain level of 7/10 [Walking] : walking [Standing] : standing [Constant] : ~He/She~ states the symptoms seem to be constant [Knee Flexion] : worsened with knee flexion [Knee Extension] : worsened with knee extension [de-identified] : Ms. SHAWNA NICK is a 58 year old female presents with worsening Right knee pain. She notes she had an injury to her knee in February, and then another injury right after seeing Dr. Fleming. She had an MRI of the right knee prior to seeing him in the office, and presents for reevaluation as her knee pain has not improved. She notes she had extreme swelling of the right knee after her second injury, which has slightly resolved since onset.  She has been taking Celebrex with some relief, but continued pain overall. \par She localizes the pain to the medial anterior aspect of the knee. She has pain with flexion and extension of the knee, and with ambulation.\par She has been doing physical therapy, with only very mild relief. She has had steroid injections in the past with failure. She would like to consider visco. \par

## 2019-03-14 NOTE — PHYSICAL EXAM
[Antalgic] : antalgic [de-identified] : On general examination the patient is adequately groomed and nourished. The vital parameters are as recorded. \par There is no lymphedema or diffuse swelling, no varicose veins, no skin warmth/erythema/scars/swelling, no ulcers and no palpable lymph nodes or masses in both lower extremities. Bilateral pedal pulses are well palpable.\par Upper Extremity:\par Both right and left upper extremities are unremarkable in terms of skin rash, lesions, pigmentation, redness, tenderness, swelling, joint instability, abnormal deformity or crepitus. The overall range of motion, sensation, motor tone and strength testing are normal.\par \par Bilateral Knee Exam: \par The gait is bilateral stiff knee antalgic.\par Knee alignment:            Right 5 degrees varus with no flexion deformity. \par Left 5 degrees varus with no flexion deformity.\par Both knees demonstrate no scars and the skin has no warmth, erythema, swelling or tenderness. \par Both knees have a range of motion of\par Extension:                    Right -5 degrees     Left 0 degrees\par Flexion:                                   Right 110 degrees      Left 110 degrees\par There is bilateral knee medial joint line tenderness. There is bilateral knee mild effusion. \par Diann's test is positive. Paresh test is positive.\par Lachman's test, Anterior/Posterior Drawer test and Pivot Shift Tests are negative. \par There is bilateral knee grade 1 MCL mediolateral laxity and no anteroposterior instability. \par Patella compression test is positive and patellofemoral tracking is normal with no lateral subluxation, apprehension or instability. \par Right knee quadriceps and hamstrings power is 4+.\par Left knee quadriceps and hamstrings power is 4+.\par \par Spine Exam:\par There is mild curvature of the spine with loss of normal lumbar lordosis. The skin is devoid of erythema, scars, pigmentation or rashes. There is mild lumbar spasm and tenderness especially at L4-L5 or L5-S1. \par The range of motion of the lumbar spine is limited by pain and spasm. Forward flexion is 80% normal, extension catch positive, lateral flexion and rotation 80% normal. There is no lumbar spine imbalance or instability detected.\par Bilateral passive SLR is right 40 degrees, left 40 degrees in supine and sitting positions. Lasegue's Test is negative.\par Neurology:\par The patient is alert and oriented in person, place and time. The mood is calm and affect is normal.\par Testing for coordination including Rhomberg's Test and Finger-Nose Test, sensation, motor tone and power and deep tendon reflexes in both lower extremities is normal.\par \par Hip Exam:\par The gait and station is normal\par The patient has equal leg lengths and no pelvic tilt. Yan/Meron test is 7 inches on the right and 7 inches on the left. Active SLR is 60 degrees on the right and 60 degrees on the left. Both hips demonstrate no scars and the skin has no signs of inflammation or tenderness. \par Both Hips have a normal range of motion of flexion to 100 degrees, abduction 40 degrees, adduction 20 degrees, external rotation 40 degrees, internal rotation 20 degrees with symmetrical motion in flexion and extension. There is no flexion contracture, deformity or instability. Labral impingement tests are negative.\par Both hips flexor, abductor and extensor power is normal.\par  [de-identified] : \par EXAM: MR KNEE RT \par \par \par PROCEDURE DATE: 02/13/2019 \par \par \par \par INTERPRETATION: \par MRI OF THE RIGHT KNEE \par \par CLINICAL INDICATION: Atraumatic right knee pain for 6 months. Predominantly \par posterior pain with trouble flexing knee. \par TECHNIQUE: Multiplanar, Multisequence MRI was obtained of the Right knee. \par COMPARISON: Bilateral knee radiographs from 2/9/2019. \par \par FINDINGS: \par \par CRUCIATE AND COLLATERAL LIGAMENTS: Trace amount of fluid adjacent to the \par medial collateral ligament (5, 15). Otherwise the ACL, PCL, MCL, and LCL \par complex are intact. \par \par MEDIAL COMPARTMENT: Obliquely oriented linear hyperintensity within the \par posterior horn consistent with meniscal tear (7, 19). Superficial and deep \par chondral fissuring. \par \par LATERAL COMPARTMENT: Meniscus is intact. Articular cartilage is preserved. \par \par PATELLOFEMORAL COMPARTMENT: Superior patellar osteophyte. Complete thickness \par cartilage loss along the lateral patellar facet measuring 1.9 x 2.8 cm with \par subjacent bone marrow edema (3, 12; 7, 12). Deep chondral fissuring along \par the median ridge and medial facet. Partial thickness chondral loss along the \par lateral facet of the trochlea with small bilateral osteophyte formation. \par \par EXTENSOR MECHANISM: Intact. \par \par SYNOVIUM/ JOINT FLUID: Physiologic amount of fluid within the joint space. \par Large Baker's cyst measuring 6.6 x 2.1 cm (7, 21) with internal septations. \par \par BONE MARROW: No acute fracture or dislocation. \par \par MUSCLES: No muscle atrophy or edema. \par \par NEUROVASCULAR STRUCTURES: Normal in course and caliber. \par \par SUBCUTANEOUS SOFT TISSUES: There is focal edema in the supralateral aspect \par of Hoffa fat. \par \par IMPRESSION: \par 1. Moderate tricompartmental osteoarthritis most prominent in the \par patellofemoral compartment. \par 2. Baker's cyst. \par 3. Small horizontal tear through the posterior of the medial meniscus. \par 4. Focal edema in the superolateral aspect of Hoffa fat, a finding seen in \par patellar tendon lateral femoral condyle friction syndrome. \par \par \par \par \par \par

## 2019-03-14 NOTE — DISCUSSION/SUMMARY
[de-identified] : Bilateral knee DJD long standing pain, with recent new onset of right knee pain and swelling, with medial meniscus tear confirmed on MRI\par The natural history and treatment of meniscus tears and DJD was discussed with the patient at length today. The spectrum of treatment including nonoperative modalities to surgical intervention was elucidated. Noninvasive and nonoperative treatment modalities include weight reduction, activity modification with low impact exercise,  as needed use of acetaminophen or anti-inflammatory medications if tolerated, glucosamine/chondroitin supplements, and physical therapy. Further treatments can include corticosteroid injection and the use of viscosupplementation with hyaluronic acid injections. Definitive surgical treatment can certainly include arthroscopy also. The risks and benefits of each treatment options was discussed and all questions were answered.\par The patient was informed of the findings and recommended conservative management in the form of a home exercise program, activity modifications, stationary bicycling, swimming and weight loss program. A trial of Glucosamine and Chondroiten Sulphate was recommended.\par A prescription for a course of physical therapy was provided for her to continue. \par She is currently taking celebrex, and will continue. Risks and benefits discussed. \par I have recommended Euflexxa injections to the bilateral knee and the patient agreed to proceed, and the risks, benefits and side effects were discussed.Follow-up appointment was recommended once the injection is received in the office to begin the series\par

## 2019-03-14 NOTE — CONSULT LETTER
[Dear  ___] : Dear  [unfilled], [Consult Letter:] : I had the pleasure of evaluating your patient, [unfilled]. [Please see my note below.] : Please see my note below. [Consult Closing:] : Thank you very much for allowing me to participate in the care of this patient.  If you have any questions, please do not hesitate to contact me. [Sincerely,] : Sincerely, [FreeTextEntry2] : PRUDENCIO MCDANIEL\par  [FreeTextEntry3] : Matt Oswald MD\par \par ______________________________________________\par [facility]: Hip/Knee Arthroplasty\par [address], [address], [place] [address]\par (t) [phone]\par (f) [phone]\par

## 2019-03-21 ENCOUNTER — RX RENEWAL (OUTPATIENT)
Age: 59
End: 2019-03-21

## 2019-03-25 ENCOUNTER — APPOINTMENT (OUTPATIENT)
Dept: RHEUMATOLOGY | Facility: CLINIC | Age: 59
End: 2019-03-25
Payer: COMMERCIAL

## 2019-03-25 PROCEDURE — 96374 THER/PROPH/DIAG INJ IV PUSH: CPT

## 2019-03-28 ENCOUNTER — APPOINTMENT (OUTPATIENT)
Dept: ORTHOPEDIC SURGERY | Facility: CLINIC | Age: 59
End: 2019-03-28
Payer: COMMERCIAL

## 2019-03-28 PROCEDURE — 20610 DRAIN/INJ JOINT/BURSA W/O US: CPT | Mod: 50

## 2019-03-28 NOTE — PROCEDURE
[Injection] : Injection [Bilateral] : of bilateral [Knee Joint] : knee joint [Osteoarthritis] : Osteoarthritis [Patient] : patient [Risk] : risk [Benefits] : benefits [Alternatives] : alternatives [Bleeding] : bleeding [Infection] : infection [Allergic Reaction] : allergic reaction [Verbal Consent Obtained] : verbal consent was obtained prior to the procedure [Betadine] : Betadine [Ethyl Chloride Spray] : ethyl chloride spray was used as a topical anesthetic [Lateral] : lateral [Anterior] : anterior [20] : a 20-gauge [2 mL Euflexxa___(lot #)] : 2mL Euflexxa ~Ulot# [unfilled] [Bandage Applied] : a bandage [Tolerated Well] : The patient tolerated the procedure well [None] : none [___ Week(s)] : in [unfilled] week(s) [de-identified] : Lot # [id number] 2/17/2020

## 2019-04-04 ENCOUNTER — APPOINTMENT (OUTPATIENT)
Dept: ORTHOPEDIC SURGERY | Facility: CLINIC | Age: 59
End: 2019-04-04
Payer: COMMERCIAL

## 2019-04-04 PROCEDURE — 20610 DRAIN/INJ JOINT/BURSA W/O US: CPT | Mod: 50

## 2019-04-04 NOTE — PROCEDURE
[Injection] : Injection [Bilateral] : of bilateral [Knee Joint] : knee joint [Osteoarthritis] : Osteoarthritis [Patient] : patient [Risk] : risk [Benefits] : benefits [Alternatives] : alternatives [Bleeding] : bleeding [Infection] : infection [Allergic Reaction] : allergic reaction [Verbal Consent Obtained] : verbal consent was obtained prior to the procedure [Betadine] : Betadine [Ethyl Chloride Spray] : ethyl chloride spray was used as a topical anesthetic [Lateral] : lateral [Anterior] : anterior [20] : a 20-gauge [2 mL Euflexxa___(lot #)] : 2mL Euflexxa ~Ulot# [unfilled] [Bandage Applied] : a bandage [Tolerated Well] : The patient tolerated the procedure well [None] : none [___ Week(s)] : in [unfilled] week(s) [de-identified] : Lot # [id number] 2/17/2020

## 2019-04-11 ENCOUNTER — APPOINTMENT (OUTPATIENT)
Dept: ORTHOPEDIC SURGERY | Facility: CLINIC | Age: 59
End: 2019-04-11
Payer: COMMERCIAL

## 2019-04-11 DIAGNOSIS — M17.0 BILATERAL PRIMARY OSTEOARTHRITIS OF KNEE: ICD-10-CM

## 2019-04-11 PROCEDURE — 20610 DRAIN/INJ JOINT/BURSA W/O US: CPT | Mod: RT

## 2019-04-11 NOTE — PROCEDURE
[Injection] : Injection [Knee Joint] : knee joint [Bilateral] : of bilateral [Patient] : patient [Osteoarthritis] : Osteoarthritis [Alternatives] : alternatives [Benefits] : benefits [Risk] : risk [Bleeding] : bleeding [Infection] : infection [Allergic Reaction] : allergic reaction [Verbal Consent Obtained] : verbal consent was obtained prior to the procedure [Betadine] : Betadine [Ethyl Chloride Spray] : ethyl chloride spray was used as a topical anesthetic [Lateral] : lateral [Anterior] : anterior [20] : a 20-gauge [2 mL Euflexxa___(lot #)] : 2mL Euflexxa ~Ulot# [unfilled] [Tolerated Well] : The patient tolerated the procedure well [Bandage Applied] : a bandage [None] : none [de-identified] : Lot # [id number] 2/17/2020 [___ Week(s)] : in [unfilled] week(s)

## 2019-05-01 ENCOUNTER — RX RENEWAL (OUTPATIENT)
Age: 59
End: 2019-05-01

## 2019-05-10 ENCOUNTER — APPOINTMENT (OUTPATIENT)
Dept: NEUROSURGERY | Facility: CLINIC | Age: 59
End: 2019-05-10

## 2019-05-14 ENCOUNTER — APPOINTMENT (OUTPATIENT)
Dept: ORTHOPEDIC SURGERY | Facility: CLINIC | Age: 59
End: 2019-05-14

## 2019-06-04 ENCOUNTER — APPOINTMENT (OUTPATIENT)
Dept: NEUROLOGY | Facility: CLINIC | Age: 59
End: 2019-06-04

## 2019-06-05 ENCOUNTER — APPOINTMENT (OUTPATIENT)
Dept: PHYSICAL MEDICINE AND REHAB | Facility: CLINIC | Age: 59
End: 2019-06-05

## 2019-07-02 ENCOUNTER — APPOINTMENT (OUTPATIENT)
Dept: NEUROSURGERY | Facility: CLINIC | Age: 59
End: 2019-07-02

## 2019-09-07 ENCOUNTER — OUTPATIENT (OUTPATIENT)
Dept: OUTPATIENT SERVICES | Facility: HOSPITAL | Age: 59
LOS: 1 days | End: 2019-09-07
Payer: COMMERCIAL

## 2019-09-07 ENCOUNTER — APPOINTMENT (OUTPATIENT)
Dept: CT IMAGING | Facility: CLINIC | Age: 59
End: 2019-09-07
Payer: COMMERCIAL

## 2019-09-07 DIAGNOSIS — R31.29 OTHER MICROSCOPIC HEMATURIA: ICD-10-CM

## 2019-09-07 DIAGNOSIS — K46.9 UNSPECIFIED ABDOMINAL HERNIA WITHOUT OBSTRUCTION OR GANGRENE: Chronic | ICD-10-CM

## 2019-09-07 PROCEDURE — 74178 CT ABD&PLV WO CNTR FLWD CNTR: CPT | Mod: 26

## 2019-09-07 PROCEDURE — 74178 CT ABD&PLV WO CNTR FLWD CNTR: CPT

## 2019-09-27 ENCOUNTER — RX RENEWAL (OUTPATIENT)
Age: 59
End: 2019-09-27

## 2019-10-08 ENCOUNTER — APPOINTMENT (OUTPATIENT)
Dept: ORTHOPEDIC SURGERY | Facility: CLINIC | Age: 59
End: 2019-10-08
Payer: COMMERCIAL

## 2019-10-08 VITALS — HEIGHT: 60 IN | WEIGHT: 143 LBS | BODY MASS INDEX: 28.07 KG/M2

## 2019-10-08 DIAGNOSIS — G89.29 PAIN IN LEFT KNEE: ICD-10-CM

## 2019-10-08 DIAGNOSIS — M25.562 PAIN IN LEFT KNEE: ICD-10-CM

## 2019-10-08 PROCEDURE — 99214 OFFICE O/P EST MOD 30 MIN: CPT

## 2019-10-08 PROCEDURE — 73564 X-RAY EXAM KNEE 4 OR MORE: CPT | Mod: 50

## 2019-10-08 NOTE — DISCUSSION/SUMMARY
[de-identified] : This patient is 3-month status post right total knee arthroplasty and she also has mild left knee osteoarthritis in the patellofemoral compartment.  I had a prolonged discussion with the patient regarding the fact that she is only 3 months from her total knee arthroplasty and she needs to give herself more time to recover.  She has no evidence of any complication on exam today.  I agree with the other surgeons opinion that she needs to continue with physical therapy.  Anti-inflammatories are recommended if she is a candidate.  She is at risk for having chronic prolonged pain and a prolonged recovery because she is a chronic pain patient has been taking narcotics preoperatively she tells me she also has a history of fibromyalgia.  She states that she really wants me to prescribe her more narcotics but I told her that this would be inappropriate for me to do so since she just had surgery with another surgeon and also she has a pain management doctor that manages her narcotics.  Continue to be weightbearing as tolerated.  She is recommended to follow-up with Dr. Ruvalcaba going forward.

## 2019-10-08 NOTE — PHYSICAL EXAM
[de-identified] : Patient is well nourished, well-developed, in no acute distress, with appropriate mood and affect. The patient is oriented to time, place, and person. Respirations are even and unlabored. Gait evaluation does reveal a limp. There is no inguinal adenopathy. Bilateral limbs are well-perfused, without skin lesions, shows a grossly normal motor and sensory examination. The right knee motion is significantly reduced and does cause significant pain. The right knee moves from 0-125 degrees. The knee is stable within that range-of-motion to AP and ML stress. The alignment of the knee is neutral.  Muscle strength is normal.  She is a well-healed midline surgical scar.  No effusion.  Pedal pulses are palpable. Hip examination was negative. The left knee motion is not significantly reduced and does cause mild pain. The left knee moves from 0-135 degrees. The knee is stable within that range-of-motion to AP and ML stress. The alignment of the knee is 5 degrees varus.  Negative patellar grind.  Muscle strength is normal. Pedal pulses are palpable. Hip examination was negative. [de-identified] : AP and lateral knee, sunrise and tunnel x-rays of the right knee were reviewed. Satisfactory position and alignment of the components are present. No signs of loosening are seen.\par \par Long standing knee, AP knee, lateral knee, and patellar views of the left knee were ordered and taken in the office and demonstrate mild degenerative joint disease of the patellofemoral joint of the knee with joint space narrowing, osteophyte formation, and subchondral sclerosis.

## 2019-10-08 NOTE — HISTORY OF PRESENT ILLNESS
[de-identified] : Is a 59-year-old female who presents with bilateral knee pain.  Here for a second opinion.  I previously evaluated her 7 months ago for bilateral anterior knee pain and diagnosed with mild to moderate osteoarthritis in the patellofemoral joint.  She also saw Dr. Oswald for this diagnosis in both he and I did not recommend surgery at that time.  Instead she went to Dr. Ruvalcaba at the [facility] for special surgery who performed a right total knee arthroplasty.  She says that the wound healed well but she has not recovered in terms of her pain since the surgery.  She does not have any more pain than before surgery but her pain is not changed.  She says that she is still ambulating with a cane.  Her surgery was on June 25, 2019 which is only 3 months ago.  She feels like she is recovering slowly.  She does have a history of being on chronic pain medications and is currently taking tramadol and gabapentin.  She also sees pain management who told her that she needs to see a psychiatrist for "pain medication seeking behavior"according to the patient.  She also states that she has a history of right lower extremity radiculopathy and has multiple spine injections in the past which do not help.  She has been working physical therapy.  She denies any fevers or chills.  Her knee is not giving way.  She is not taking anti-inflammatory medications because she does not like how her body reacts to these medications.  She does not use any brace.  She has been seeing her surgeon who told her that she just needs more time to recover to continue with physical therapy.

## 2019-11-15 ENCOUNTER — APPOINTMENT (OUTPATIENT)
Dept: ORTHOPEDIC SURGERY | Facility: CLINIC | Age: 59
End: 2019-11-15

## 2020-02-04 ENCOUNTER — APPOINTMENT (OUTPATIENT)
Dept: ORTHOPEDIC SURGERY | Facility: CLINIC | Age: 60
End: 2020-02-04

## 2020-02-04 ENCOUNTER — APPOINTMENT (OUTPATIENT)
Dept: PAIN MANAGEMENT | Facility: CLINIC | Age: 60
End: 2020-02-04

## 2020-02-04 ENCOUNTER — APPOINTMENT (OUTPATIENT)
Dept: RHEUMATOLOGY | Facility: CLINIC | Age: 60
End: 2020-02-04
Payer: COMMERCIAL

## 2020-02-04 ENCOUNTER — APPOINTMENT (OUTPATIENT)
Dept: ENDOCRINOLOGY | Facility: CLINIC | Age: 60
End: 2020-02-04
Payer: COMMERCIAL

## 2020-02-04 VITALS
SYSTOLIC BLOOD PRESSURE: 123 MMHG | DIASTOLIC BLOOD PRESSURE: 75 MMHG | HEART RATE: 73 BPM | TEMPERATURE: 98 F | WEIGHT: 134 LBS | HEIGHT: 60 IN | RESPIRATION RATE: 16 BRPM | BODY MASS INDEX: 26.31 KG/M2 | OXYGEN SATURATION: 98 %

## 2020-02-04 VITALS
OXYGEN SATURATION: 96 % | HEART RATE: 80 BPM | SYSTOLIC BLOOD PRESSURE: 122 MMHG | HEIGHT: 60 IN | WEIGHT: 134 LBS | DIASTOLIC BLOOD PRESSURE: 80 MMHG | BODY MASS INDEX: 26.31 KG/M2

## 2020-02-04 PROCEDURE — 99214 OFFICE O/P EST MOD 30 MIN: CPT | Mod: 25

## 2020-02-04 PROCEDURE — ZZZZZ: CPT

## 2020-02-04 PROCEDURE — 77080 DXA BONE DENSITY AXIAL: CPT

## 2020-02-04 PROCEDURE — 99214 OFFICE O/P EST MOD 30 MIN: CPT

## 2020-02-04 RX ORDER — CELECOXIB 100 MG/1
100 CAPSULE ORAL
Qty: 60 | Refills: 0 | Status: DISCONTINUED | COMMUNITY
Start: 2019-02-19 | End: 2020-02-04

## 2020-02-04 RX ORDER — PHENAZOPYRIDINE HYDROCHLORIDE 200 MG/1
200 TABLET ORAL
Qty: 6 | Refills: 0 | Status: DISCONTINUED | COMMUNITY
Start: 2017-06-06 | End: 2020-02-04

## 2020-02-04 RX ORDER — DIPHENHYDRAMINE HYDROCHLORIDE AND LIDOCAINE HYDROCHLORIDE AND ALUMINUM HYDROXIDE AND MAGNESIUM HYDRO
KIT
Qty: 237 | Refills: 0 | Status: DISCONTINUED | COMMUNITY
Start: 2018-02-20 | End: 2020-02-04

## 2020-02-04 RX ORDER — AMITRIPTYLINE HYDROCHLORIDE 10 MG/1
10 TABLET, FILM COATED ORAL
Qty: 60 | Refills: 2 | Status: DISCONTINUED | COMMUNITY
Start: 2018-07-21 | End: 2020-02-04

## 2020-02-04 RX ORDER — TRAMADOL HYDROCHLORIDE 50 MG/1
50 TABLET, COATED ORAL 3 TIMES DAILY
Qty: 60 | Refills: 0 | Status: DISCONTINUED | COMMUNITY
Start: 2019-03-21 | End: 2020-02-04

## 2020-02-04 RX ORDER — HYALURONATE SODIUM 20 MG/2 ML
20 SYRINGE (ML) INTRAARTICULAR
Qty: 2 | Refills: 0 | Status: DISCONTINUED | OUTPATIENT
Start: 2019-03-14 | End: 2020-02-04

## 2020-02-04 RX ORDER — GLUC/MSM/COLGN2/HYAL/ANTIARTH3 375-375-20
240 (27 FE) TABLET ORAL DAILY
Qty: 30 | Refills: 0 | Status: DISCONTINUED | COMMUNITY
Start: 2018-04-24 | End: 2020-02-04

## 2020-02-04 RX ORDER — CELECOXIB 100 MG/1
100 CAPSULE ORAL
Qty: 60 | Refills: 0 | Status: DISCONTINUED | COMMUNITY
Start: 2019-03-18 | End: 2020-02-04

## 2020-02-04 RX ORDER — PREDNISONE 5 MG/1
5 TABLET ORAL
Qty: 360 | Refills: 0 | Status: DISCONTINUED | COMMUNITY
Start: 2017-11-20 | End: 2020-02-04

## 2020-02-04 RX ORDER — GABAPENTIN 100 MG/1
100 CAPSULE ORAL 3 TIMES DAILY
Qty: 90 | Refills: 0 | Status: DISCONTINUED | COMMUNITY
Start: 2019-04-04 | End: 2020-02-04

## 2020-02-04 RX ORDER — COLD-HOT PACK
125 MCG EACH MISCELLANEOUS
Refills: 0 | Status: ACTIVE | COMMUNITY

## 2020-02-04 RX ORDER — ZINC SULFATE 66 MG
66 TABLET ORAL DAILY
Qty: 90 | Refills: 0 | Status: DISCONTINUED | COMMUNITY
Start: 2018-04-24 | End: 2020-02-04

## 2020-02-04 RX ORDER — COLD-HOT PACK
50 EACH MISCELLANEOUS DAILY
Qty: 30 | Refills: 3 | Status: DISCONTINUED | COMMUNITY
Start: 2018-04-25 | End: 2020-02-04

## 2020-02-04 NOTE — HISTORY OF PRESENT ILLNESS
[Prolia (Denosumab)] : Prolia (Denosumab) [Calcium (supplements)] : calcium from a dietary supplement [Taking Steroids] : a history of taking steroids [Family History of Osteoporosis] : family history of osteoporosis [Regular Dental Follow-Up] : regular dental follow-up [FreeTextEntry1] : Ms. NICK is a 59 year old female w/ osteoporosis\par \par Pt was first told of low bone density ~2013. Pt was being treated by PCP. She previously took an oral bisphosphonates and had severe UGI sx. She had endoscopies, no ulcers found. BMD Nov 2017 vs 2014, spine: -2.8, osteoporosis ( -11.4%) Total hip: -2.5 osteoporosis (-9.6%) Fem neck: -2.8 osteoporosis (-6.8%) Proximal wrist: -1.1 osteopenia (-3.4%). Pt was then put on Prolia for 2 doses (last ~June 2018). She states on Prolia she has severe myalgias. No h/o fx. FHx of osteoporosis (mother and sister). Pt had hysterectomy at 42 due to fibroids and went through menopause ~ 52. No HRT. No hot flashes. She gets moderate amounts of dietary Ca and takes Ca. BMD 2/2019 indicates improvement in spine and tot hip, only fem neck consistent with osteoporosis (likely due to Prolia). Pt had first dose of IV Reclast 3/2019. Tolerating well. No thigh pain, no interval fx. Pt had APR. No ONJ.\par \par Pt has fibromyalgia; previously on long term Prednisone for 6 mons in 2018. She then d/c and more recently restarted for her pain. c/o joint pain and pt can not walk well due to knee pain. Pt does not exercise. She states she recently gained wt despite no change in health habits.\par \par Pt had RKR 6/2019. No complications. Pt still has some discomfort from procedure. Pt still ambulates w/ cane.\par \par Endocrine hx notable for R thyroid nodule, FNA biopsy benign with Dr. Shahriar Strauss. No dysphagia or neck pain. No sx of hyper or hypothyroidism.  [Low Vit D Intake/Exposure] : no past or present history of low vitamin D intake [Low Calcium Intake] : no past or present history of low calcium intake [Frequent Falls] : no frequent falls [High Fall Risk] : no fall risk [Uses a Walker/Cane] : no use of a walker/cane [Hyperparathyroidism] : no hyperparathyroidism [Family History of Hip Fracture] : no family history of hip fracture [Family History of Breast Cancer] : no family history of breast cancer [Previous Fragility Fracture] : no previous fragility fracture [History of Radiation Therapy] : no history of radiation therapy [History of Blood Clots] : no history of blood clots

## 2020-02-04 NOTE — ASSESSMENT
[Bisphosphonate Therapy] : Risks  and benefits of bisphosphonate therapy were  discussed with the patient including gastroesophageal irritation, osteonecrosis of the jaw, and atypical femur fractures, and acute phase reaction [FreeTextEntry1] : 59 year old female with osteoporosis. \par \par Pt has known of low bone density since ~ 2013. She was previously intolerant to oral bisphosphonates due to UGI sx. BMD 2017 decreased while on drug holiday. Pt then had 2 doses of Prolia (last ~June 2018). She did not tolerate due to severe myalgias. Pt has previously been on long term steroids and more recently restarted Prednisone ~Feb 2019. BMD 2/2019 indicates improvement in spine and tot hip, only fem neck consistent with osteoporosis (likely due to Prolia). Discussed rx w/ IV Reclast at previous visit. Risks and benefits discussed. All questions were answered. Pt had first dose of IV Reclast 3/2019. Tolerating well. No thigh pain, no interval fx. Pt had APR. No ONJ. BMD 2/2020 indicates stable osteopenia in spine, total hip, and proximal radius; stable osteoporosis in femoral neck. Ca 9.7, normal. Recommend pt delay next IV Reclast dose.\par \par Of note, pt had RKR 6/2019. No complications. Pt still has some discomfort from procedure. Pt still ambulates w/ cane.\par \par Endocrine hx is notable for thyroid nodule, previously benign on biopsy, pt has no clear sx of hyper or hypothyroidism.\par \par f/u in 1 year w/ repeat BMD

## 2020-02-04 NOTE — REVIEW OF SYSTEMS
[Joint Pain] : joint pain [Myalgia] : myalgia  [Recent Weight Gain (___ Lbs)] : recent [unfilled] ~Ulb weight gain [Negative] : Heme/Lymph

## 2020-02-04 NOTE — END OF VISIT
[FreeTextEntry3] : I, Rogelio Sanchez, authored this note working as a medical scribe for Dr. Stephens.  02/04/2020.  9:30AM. This note was authored by the medical scribe for me. I have reviewed, edited, and revised the note as needed. I am in agreement with the exam findings, imaging findings, and treatment plan.  Sudeep Stephens MD

## 2020-02-04 NOTE — PROCEDURE
[FreeTextEntry1] : Bone mineral density: 02/04/2020 \par Indication: vs. 2019\par Spine: -2.2 osteopenia, no significant change\par Total hip: -2.2 osteopenia, no significant change\par Femoral neck: -2.6 osteoporosis, no significant change\par Proximal radius: -1.4 osteopenia, no significant change\par \par Bone mineral density: 02/21/2019 \par Indication: vs. 2017 \par Spine: -2.2 osteopenia (+8.0%)\par Total hip: -2.3 osteopenia (+4.9%)\par Femoral neck: -2.6 osteoporosis, no significant change \par Proximal radius: -1.3 osteopenia, no significant change \par

## 2020-02-04 NOTE — PHYSICAL EXAM
[Alert] : alert [Well Nourished] : well nourished [No Acute Distress] : no acute distress [Well Developed] : well developed [EOMI] : extra ocular movement intact [Normal Sclera/Conjunctiva] : normal sclera/conjunctiva [No Proptosis] : no proptosis [Normal Oropharynx] : the oropharynx was normal [No Respiratory Distress] : no respiratory distress [No Accessory Muscle Use] : no accessory muscle use [Clear to Auscultation] : lungs were clear to auscultation bilaterally [Normal Rate] : heart rate was normal  [Normal S1, S2] : normal S1 and S2 [Regular Rhythm] : with a regular rhythm [Normal Bowel Sounds] : normal bowel sounds [Soft] : abdomen soft [Not Tender] : non-tender [Anterior Cervical Nodes] : anterior cervical nodes [Not Distended] : not distended [Post Cervical Nodes] : posterior cervical nodes [Normal] : normal and non tender [Axillary Nodes] : axillary nodes [Spine Straight] : spine straight [No Spinal Tenderness] : no spinal tenderness [Normal Gait] : normal gait [No Stigmata of Cushings Syndrome] : no stigmata of cushings syndrome [Normal Strength/Tone] : muscle strength and tone were normal [No Rash] : no rash [Normal Reflexes] : deep tendon reflexes were 2+ and symmetric [Oriented x3] : oriented to person, place, and time [No Tremors] : no tremors [Acanthosis Nigricans] : no acanthosis nigricans [de-identified] : R thyroid nodule ~1 cm

## 2020-03-05 ENCOUNTER — APPOINTMENT (OUTPATIENT)
Dept: ORTHOPEDIC SURGERY | Facility: CLINIC | Age: 60
End: 2020-03-05

## 2020-03-19 ENCOUNTER — APPOINTMENT (OUTPATIENT)
Dept: ORTHOPEDIC SURGERY | Facility: CLINIC | Age: 60
End: 2020-03-19

## 2020-04-24 ENCOUNTER — APPOINTMENT (OUTPATIENT)
Dept: RHEUMATOLOGY | Facility: CLINIC | Age: 60
End: 2020-04-24
Payer: COMMERCIAL

## 2020-04-24 PROCEDURE — 99442: CPT

## 2020-04-24 RX ORDER — SECUKINUMAB 150 MG/ML
150 INJECTION SUBCUTANEOUS
Qty: 12 | Refills: 3 | Status: DISCONTINUED | COMMUNITY
Start: 2018-03-23 | End: 2020-04-24

## 2020-04-24 RX ORDER — B-COMPLEX WITH VITAMIN C
TABLET ORAL DAILY
Qty: 30 | Refills: 0 | Status: DISCONTINUED | COMMUNITY
Start: 2018-04-24 | End: 2020-04-24

## 2020-05-06 ENCOUNTER — APPOINTMENT (OUTPATIENT)
Dept: ORTHOPEDIC SURGERY | Facility: CLINIC | Age: 60
End: 2020-05-06
Payer: COMMERCIAL

## 2020-05-06 DIAGNOSIS — M17.11 UNILATERAL PRIMARY OSTEOARTHRITIS, RIGHT KNEE: ICD-10-CM

## 2020-05-06 DIAGNOSIS — M17.10 UNILATERAL PRIMARY OSTEOARTHRITIS, UNSPECIFIED KNEE: ICD-10-CM

## 2020-05-06 DIAGNOSIS — M17.12 UNILATERAL PRIMARY OSTEOARTHRITIS, LEFT KNEE: ICD-10-CM

## 2020-05-06 PROCEDURE — 72190 X-RAY EXAM OF PELVIS: CPT

## 2020-05-06 PROCEDURE — 99215 OFFICE O/P EST HI 40 MIN: CPT

## 2020-05-06 PROCEDURE — 73564 X-RAY EXAM KNEE 4 OR MORE: CPT | Mod: 50

## 2020-05-16 ENCOUNTER — RX RENEWAL (OUTPATIENT)
Age: 60
End: 2020-05-16

## 2020-06-03 ENCOUNTER — APPOINTMENT (OUTPATIENT)
Dept: RHEUMATOLOGY | Facility: CLINIC | Age: 60
End: 2020-06-03

## 2020-06-09 ENCOUNTER — APPOINTMENT (OUTPATIENT)
Dept: OTOLARYNGOLOGY | Facility: CLINIC | Age: 60
End: 2020-06-09

## 2020-06-10 ENCOUNTER — APPOINTMENT (OUTPATIENT)
Dept: OTOLARYNGOLOGY | Facility: CLINIC | Age: 60
End: 2020-06-10
Payer: COMMERCIAL

## 2020-06-10 VITALS
SYSTOLIC BLOOD PRESSURE: 121 MMHG | HEIGHT: 60 IN | DIASTOLIC BLOOD PRESSURE: 81 MMHG | WEIGHT: 133 LBS | TEMPERATURE: 97.7 F | BODY MASS INDEX: 26.11 KG/M2 | HEART RATE: 105 BPM

## 2020-06-10 DIAGNOSIS — K21.9 GASTRO-ESOPHAGEAL REFLUX DISEASE W/OUT ESOPHAGITIS: ICD-10-CM

## 2020-06-10 DIAGNOSIS — M26.609 UNSPECIFIED TEMPOROMANDIBULAR JOINT DISORDER: ICD-10-CM

## 2020-06-10 DIAGNOSIS — H93.8X3 OTHER SPECIFIED DISORDERS OF EAR, BILATERAL: ICD-10-CM

## 2020-06-10 PROCEDURE — 99214 OFFICE O/P EST MOD 30 MIN: CPT

## 2020-06-10 NOTE — HISTORY OF PRESENT ILLNESS
[de-identified] : 58 yo female\par Patient with hx of Fibromyalgia complains of right sided ear pain. Started with intermittent sharp  pain lasting several seconds to a constant dull pain. \par Pt has no ear drainage, hearing loss, tinnitus, vertigo, nasal congestion, nasal discharge, epistaxis, sinus infections, facial pain, facial pressure, throat pain, dysphagia or fevers\par \par  [Tinnitus] : no tinnitus [Vertigo] : no vertigo [Anxiety] : no anxiety [Dizziness] : no dizziness [Headache] : no headache [Hearing Loss] : no hearing loss [Recurrent Otitis Media] : no recurrent otitis media [Otitis Media with Effusion] : no otitis media with effusion [Presbycusis] : no presbycusis [Congenital Ear Malformation] : no congenital ear malformation [Meniere Disease] : no Meniere disease [Otosclerosis] : no otosclerosis [Perilymphatic Fistula] : no perilymphatic fistula [Hypertension] : no hypertension [Loud Noise Exposure] : no history of loud noise exposure [Smoking] : no smoking [Early Onset Hearing Loss] : no early onset hearing loss [Stroke] : no stroke [Facial Pain] : no facial pain [Nasal Congestion] : no nasal congestion [Facial Pressure] : no facial pressure [Diplopia] : no diplopia [Ear Fullness] : no ear fullness [Allergic Rhinitis] : no allergic rhinitis [Maxillary Tooth Infection] : no maxillary tooth infection [Septal Deviation] : no septal deviation [Environmental Allergies] : no environmental allergies [Seasonal Allergies] : no seasonal allergies [Environmental Allergens] : no environmental allergens [Nasal FB Removal] : no nasal foreign body removal [Allergies] : no allergies [Asthma] : no asthma [Neck Mass] : no neck mass [Neck Pain] : no neck pain [Chills] : no chills [Cold Intolerance] : no cold intolerance [Cough] : no cough [Fatigue] : no fatigue [Heat Intolerance] : no heat intolerance [Hyperthyroidism] : no hyperthyroidism [Sialadenitis] : no sialadenitis [Hodgkin Disease] : no hodgkin disease [Non-Hodgkin Lymphoma] : no non-hodgkin lymphoma [Tobacco Use] : no tobacco use [Alcohol Use] : no alcohol use [Graves Disease] : no graves disease [Thyroid Cancer] : no thyroid cancer

## 2020-06-10 NOTE — PHYSICAL EXAM
[Midline] : trachea located in midline position [Normal] : no rashes [de-identified] : right sided crepitus

## 2020-06-10 NOTE — ASSESSMENT
[FreeTextEntry1] : TMJ:\par -soft food diet \par -dental evaluation \par -Tylenol for pain \par -warm and cold compresses\par \par \par f/u prn

## 2020-06-23 ENCOUNTER — APPOINTMENT (OUTPATIENT)
Dept: ORTHOPEDIC SURGERY | Facility: CLINIC | Age: 60
End: 2020-06-23

## 2020-07-02 ENCOUNTER — OUTPATIENT (OUTPATIENT)
Dept: OUTPATIENT SERVICES | Facility: HOSPITAL | Age: 60
LOS: 1 days | End: 2020-07-02
Payer: COMMERCIAL

## 2020-07-02 ENCOUNTER — APPOINTMENT (OUTPATIENT)
Dept: MRI IMAGING | Facility: CLINIC | Age: 60
End: 2020-07-02
Payer: COMMERCIAL

## 2020-07-02 ENCOUNTER — RECORD ABSTRACTING (OUTPATIENT)
Age: 60
End: 2020-07-02

## 2020-07-02 DIAGNOSIS — K46.9 UNSPECIFIED ABDOMINAL HERNIA WITHOUT OBSTRUCTION OR GANGRENE: Chronic | ICD-10-CM

## 2020-07-02 DIAGNOSIS — Z00.8 ENCOUNTER FOR OTHER GENERAL EXAMINATION: ICD-10-CM

## 2020-07-02 PROCEDURE — 70553 MRI BRAIN STEM W/O & W/DYE: CPT

## 2020-07-02 PROCEDURE — A9585: CPT

## 2020-07-02 PROCEDURE — 70553 MRI BRAIN STEM W/O & W/DYE: CPT | Mod: 26

## 2020-07-14 ENCOUNTER — APPOINTMENT (OUTPATIENT)
Dept: NEUROSURGERY | Facility: CLINIC | Age: 60
End: 2020-07-14
Payer: COMMERCIAL

## 2020-07-14 VITALS
DIASTOLIC BLOOD PRESSURE: 80 MMHG | BODY MASS INDEX: 26.11 KG/M2 | HEART RATE: 70 BPM | OXYGEN SATURATION: 100 % | WEIGHT: 133 LBS | HEIGHT: 60 IN | RESPIRATION RATE: 16 BRPM | SYSTOLIC BLOOD PRESSURE: 130 MMHG

## 2020-07-14 PROCEDURE — 99215 OFFICE O/P EST HI 40 MIN: CPT

## 2020-07-20 ENCOUNTER — NON-APPOINTMENT (OUTPATIENT)
Age: 60
End: 2020-07-20

## 2020-07-20 ENCOUNTER — TRANSCRIPTION ENCOUNTER (OUTPATIENT)
Age: 60
End: 2020-07-20

## 2020-07-20 ENCOUNTER — APPOINTMENT (OUTPATIENT)
Dept: RHEUMATOLOGY | Facility: CLINIC | Age: 60
End: 2020-07-20
Payer: COMMERCIAL

## 2020-07-20 ENCOUNTER — APPOINTMENT (OUTPATIENT)
Dept: CARDIOLOGY | Facility: CLINIC | Age: 60
End: 2020-07-20
Payer: COMMERCIAL

## 2020-07-20 VITALS — SYSTOLIC BLOOD PRESSURE: 148 MMHG | DIASTOLIC BLOOD PRESSURE: 84 MMHG

## 2020-07-20 VITALS
TEMPERATURE: 97.9 F | DIASTOLIC BLOOD PRESSURE: 79 MMHG | SYSTOLIC BLOOD PRESSURE: 144 MMHG | HEART RATE: 74 BPM | WEIGHT: 130 LBS | HEIGHT: 60 IN | OXYGEN SATURATION: 99 % | BODY MASS INDEX: 25.52 KG/M2

## 2020-07-20 VITALS
HEART RATE: 70 BPM | BODY MASS INDEX: 25.39 KG/M2 | OXYGEN SATURATION: 97 % | DIASTOLIC BLOOD PRESSURE: 80 MMHG | TEMPERATURE: 97.6 F | WEIGHT: 130 LBS | SYSTOLIC BLOOD PRESSURE: 140 MMHG

## 2020-07-20 DIAGNOSIS — Z82.49 FAMILY HISTORY OF ISCHEMIC HEART DISEASE AND OTHER DISEASES OF THE CIRCULATORY SYSTEM: ICD-10-CM

## 2020-07-20 DIAGNOSIS — T14.8XXA OTHER INJURY OF UNSPECIFIED BODY REGION, INITIAL ENCOUNTER: ICD-10-CM

## 2020-07-20 DIAGNOSIS — M25.569 PAIN IN UNSPECIFIED KNEE: ICD-10-CM

## 2020-07-20 DIAGNOSIS — R00.2 PALPITATIONS: ICD-10-CM

## 2020-07-20 LAB
25(OH)D3 SERPL-MCNC: 55.2 NG/ML
ALBUMIN SERPL ELPH-MCNC: 4.8 G/DL
ALP BLD-CCNC: 57 U/L
ALT SERPL-CCNC: 14 U/L
ANION GAP SERPL CALC-SCNC: 12 MMOL/L
AST SERPL-CCNC: 14 U/L
BASOPHILS # BLD AUTO: 0.03 K/UL
BASOPHILS NFR BLD AUTO: 0.4 %
BILIRUB SERPL-MCNC: 0.3 MG/DL
BUN SERPL-MCNC: 18 MG/DL
CALCIUM SERPL-MCNC: 9.7 MG/DL
CHLORIDE SERPL-SCNC: 103 MMOL/L
CO2 SERPL-SCNC: 25 MMOL/L
CREAT SERPL-MCNC: 0.78 MG/DL
CRP SERPL-MCNC: <0.1 MG/DL
EOSINOPHIL # BLD AUTO: 0.06 K/UL
EOSINOPHIL NFR BLD AUTO: 0.7 %
GLUCOSE SERPL-MCNC: 85 MG/DL
HCT VFR BLD CALC: 41.8 %
HGB BLD-MCNC: 13.9 G/DL
IMM GRANULOCYTES NFR BLD AUTO: 0.2 %
LYMPHOCYTES # BLD AUTO: 3.27 K/UL
LYMPHOCYTES NFR BLD AUTO: 38.3 %
MAN DIFF?: NORMAL
MCHC RBC-ENTMCNC: 29.8 PG
MCHC RBC-ENTMCNC: 33.3 GM/DL
MCV RBC AUTO: 89.5 FL
MONOCYTES # BLD AUTO: 0.83 K/UL
MONOCYTES NFR BLD AUTO: 9.7 %
NEUTROPHILS # BLD AUTO: 4.33 K/UL
NEUTROPHILS NFR BLD AUTO: 50.7 %
PLATELET # BLD AUTO: 239 K/UL
POTASSIUM SERPL-SCNC: 4.5 MMOL/L
PROT SERPL-MCNC: 7.3 G/DL
RBC # BLD: 4.67 M/UL
RBC # FLD: 13 %
SODIUM SERPL-SCNC: 140 MMOL/L
TSH SERPL-ACNC: 0.74 UIU/ML
WBC # FLD AUTO: 8.54 K/UL

## 2020-07-20 PROCEDURE — 93040 RHYTHM ECG WITH REPORT: CPT | Mod: 59

## 2020-07-20 PROCEDURE — 93000 ELECTROCARDIOGRAM COMPLETE: CPT

## 2020-07-20 PROCEDURE — 99204 OFFICE O/P NEW MOD 45 MIN: CPT

## 2020-07-20 PROCEDURE — 36415 COLL VENOUS BLD VENIPUNCTURE: CPT

## 2020-07-20 PROCEDURE — 99215 OFFICE O/P EST HI 40 MIN: CPT

## 2020-07-20 NOTE — PHYSICAL EXAM
[General Appearance - In No Acute Distress] : in no acute distress [] : no rash [FreeTextEntry1] : TTP over several DIP, PIP, no sausage digits   +bilat plantar TTP

## 2020-07-20 NOTE — HISTORY OF PRESENT ILLNESS
[Joint Swelling] : joint swelling [Joint Pain] : joint pain [Morning Stiffness] : morning stiffness [Ocular Symptoms] : no ocular symptoms [FreeTextEntry1] : 59 yof last seen by me 5 yrs ago but has been in the care of my colleague\par when i met her based on SI tenderness, inflammatory back pain and +HLAB27 pt was given sx of sacroiliits/SpA\par she has scalp ps as well - today we discuss SPA/PSA in detail\par she was initiated on humira by me \par also has FMS and chronic Back pain with DDD, DJD\par had R TKR at [facility] 2019 has persistent knee pain\par today reports digital pain, swelling hands\par also with nail issues \par R 1st toe numbness\par plantar pain\par she is in the care of ORTHO, Pain Management and as above Emilia Garcia from \par my group who is the director of the [facility].\par She doesn't tolerate PO meds well\par after 2ndary failure on humira changed to cimzia then cosentyx\par today she comes in inquiring about restarting Cosentyx\par in the interim she has been dx with Meningioma\par recently had fuv with neuroSX and has slight increased size of edema surrouding the lesion\par has not ever had surgery \par \par currently has no skin lesions or nail lesions \par she wants my advice on implantation of some type of battery operated unit by Pain management \par she doesn't have details on this \par

## 2020-07-20 NOTE — ASSESSMENT
[FreeTextEntry1] : ps/psa/SPA HLAB27+, FMS, chronic back pain/OA/DDD/knee pain\par now fuv with me after 5 yrs of treatment with other Rheum \par has some inflammatory sx \par would consider restarting cosentyx and not anti-TNF given brain lesions\par would advise holding off new Rx as she is due for surgery next month and the cosentyxx\par would potentially be held anyway \par also encouraged she speak with her ONC MD first as well\par labs ordered to monitor disease activity and for medication side effects.  Will notify of results\par patient is aware of my assessment and plans\par fuv 6wks

## 2020-07-21 LAB
ERYTHROCYTE [SEDIMENTATION RATE] IN BLOOD BY WESTERGREN METHOD: 7 MM/HR
HBV CORE IGG+IGM SER QL: NONREACTIVE
HBV SURFACE AB SER QL: NONREACTIVE
HBV SURFACE AG SER QL: NONREACTIVE
HCV AB SER QL: NONREACTIVE
HCV S/CO RATIO: 0.17 S/CO

## 2020-07-21 NOTE — ASSESSMENT
[FreeTextEntry1] : Ms. Sutton is a 59 year old woman with a progressively enlarging right temporal meningioma with increasing associated vasogenic cerebral edema. She is mildly symptomatic from this with daily headaches.\par \par Given the progressive increase in the edema associated with this meningioma, I recommend surgical resection for histopathologic diagnosis and relief of edema and associated symptoms.\par \par Risks, benefits, and alternatives were discussed with the patient at length and she plans to consider her options. I have also recommended that she obtain a second opinion and I will help facilitate that. She will be in touch if she  would like to proceed with surgery.\par \par 45 minutes were spent in direct patient care.

## 2020-07-21 NOTE — REASON FOR VISIT
[FreeTextEntry1] : 7/2/2020 MRI Brain w/wo - PACS/ report below \par IMPRESSION: Meningioma again seen with slight increase in size of \par surrounding edema. \par

## 2020-07-21 NOTE — HISTORY OF PRESENT ILLNESS
[FreeTextEntry1] : Mrs. Sutton is a 58 yo woman with PMH of fibromyalgia, psoriasis, and osteoporosis who arrives for 2 year follow up of right temporal meningioma.  \par \par 2017:  She has occasional headaches 1-2x/week and complains of short term memory loss, and hypophonia with tongue pain being treated by Rheumatology and ENT.  Taking Tylenol for headache with relief.  \par \par Denies h/a,n/v/d/f/ns/cp/palp/sob/v/h/sp disturbances.

## 2020-07-21 NOTE — RESULTS/DATA
[FreeTextEntry1] : [facility]\par    [facility] [place] [facility] of [facility] Department of Radiology\par   Radiology Report\par \par \par Patient Name: SANDOVAL MATOS   Report Date: 02-Jul-2020 14:48.00 \par Patient ID: [id number] ([id number]), [id number] ([id number])  Accession No.: [id number] \par Patient Birth Date: 1960  Report Status: F \par Referring Physician: [id number] GUNJAN BLANC   Reason For Study: eval rt temporal meningioma  \par \par \par \par \par \par \par \par \par \par EXAM: MR BRAIN WAW IC \par \par \par PROCEDURE DATE: 07/02/2020 \par \par \par \par INTERPRETATION: Clinical indication: Follow-up right temporal meningioma. \par \par MRI of the brain was performed using sagittal T2 and T2 FLAIR sequence. \par Axial T1 diffusion and susceptibility weighted sequences were performed as \par well. Coronal T2-weighted FLAIR sequence was performed. The patient was \par injected with approximately 5.5 cc of gadolinium this IV with 2 cc of \par contrast discarded. Axial T1-weighted sequences were performed. \par \par This exam is compared with prior contrast enhanced brain performed on March \par 2, 2019. \par \par Asymmetry of the lateral ventricles are again seen with the left lateral \par ventricle slightly more prominent than the right. \par \par Again seen is a homogeneous enhancing extra-axial lesion involving the right \par temporal region. This lesion measures approximately 2.8 x 1.9 cm and \par previously measured approximately 2.4 x 1.5 cm. This is likely compatible \par with patient's known meningioma. Slight increase surrounding edema is \par identified. Slight increased mass effect is seen on the right lateral \par ventricle. No significant midline shift or herniation is seen. Localized \par mass effect is seen consisting of sulcal effacement. \par \par The large vessels demonstrate normal flow voids \par \par The visualized paranasal sinuses and mastoid and middle ear regions appear \par clear. \par \par IMPRESSION: Meningioma again seen with slight increase in size of \par surrounding edema. \par \par \par \par \par \par \par \par \par BELTRAN MILNER M.D., ATTENDING RADIOLOGIST \par This document has been electronically signed. Jul 2 2020 2:48PM \par \par \par \par \par \par  \par

## 2020-07-22 LAB
M TB IFN-G BLD-IMP: NEGATIVE
QUANTIFERON TB PLUS MITOGEN MINUS NIL: 8.27 IU/ML
QUANTIFERON TB PLUS NIL: 0.02 IU/ML
QUANTIFERON TB PLUS TB1 MINUS NIL: 0 IU/ML
QUANTIFERON TB PLUS TB2 MINUS NIL: 0.01 IU/ML

## 2020-07-24 ENCOUNTER — APPOINTMENT (OUTPATIENT)
Dept: NEUROSURGERY | Facility: CLINIC | Age: 60
End: 2020-07-24
Payer: COMMERCIAL

## 2020-07-24 VITALS
WEIGHT: 129 LBS | HEIGHT: 60 IN | SYSTOLIC BLOOD PRESSURE: 112 MMHG | RESPIRATION RATE: 18 BRPM | HEART RATE: 73 BPM | OXYGEN SATURATION: 98 % | BODY MASS INDEX: 25.32 KG/M2 | TEMPERATURE: 97.9 F | DIASTOLIC BLOOD PRESSURE: 71 MMHG

## 2020-07-24 PROCEDURE — 99215 OFFICE O/P EST HI 40 MIN: CPT

## 2020-07-24 RX ORDER — CELECOXIB 200 MG/1
200 CAPSULE ORAL TWICE DAILY
Qty: 60 | Refills: 2 | Status: DISCONTINUED | COMMUNITY
Start: 2019-04-18 | End: 2020-07-24

## 2020-07-24 RX ORDER — GLUCOSAMINE/MSM/CHONDROIT SULF 500-166.6
1500 TABLET ORAL
Refills: 0 | Status: DISCONTINUED | COMMUNITY
End: 2020-07-24

## 2020-07-24 RX ORDER — GLUCOSAMINE HCL/CHONDROITIN SU 500-400 MG
3 CAPSULE ORAL
Qty: 9 | Refills: 0 | Status: DISCONTINUED | COMMUNITY
End: 2020-07-24

## 2020-07-24 RX ORDER — ZOLEDRONIC ACID 5 MG/100ML
5 INJECTION, SOLUTION INTRAVENOUS
Qty: 1 | Refills: 0 | Status: DISCONTINUED | COMMUNITY
Start: 2019-02-27 | End: 2020-07-24

## 2020-07-24 NOTE — DATA REVIEWED
[de-identified] : I have reviewed the most recent MRI 7/2/20  which shows a 2.8 X 1.9 cm homogenously enhancing extra-axial mass involving he right temporal region, slight interval increase in size and surrounding edema when compared to prior imaging 3/2/2019

## 2020-07-24 NOTE — PHYSICAL EXAM
[General Appearance - In No Acute Distress] : in no acute distress [General Appearance - Alert] : alert [Oriented To Time, Place, And Person] : oriented to person, place, and time [Cranial Nerves Optic (II)] : visual acuity intact bilaterally,  pupils equal round and reactive to light [Cranial Nerves Oculomotor (III)] : extraocular motion intact [Cranial Nerves Trigeminal (V)] : facial sensation intact symmetrically [Cranial Nerves Glossopharyngeal (IX)] : tongue and palate midline [Cranial Nerves Vestibulocochlear (VIII)] : hearing was intact bilaterally [Cranial Nerves Facial (VII)] : face symmetrical [Cranial Nerves Accessory (XI - Cranial And Spinal)] : head turning and shoulder shrug symmetric [Cranial Nerves Hypoglossal (XII)] : there was no tongue deviation with protrusion [Motor Tone] : muscle tone was normal in all four extremities [Motor Handedness Right-Handed] : the patient is right hand dominant [Sensation Tactile Decrease] : light touch was intact [Balance] : balance was intact [Extraocular Movements] : extraocular movements were intact [PERRL With Normal Accommodation] : pupils were equal in size, round, reactive to light, with normal accommodation [Full Visual Field] : full visual field [] : no respiratory distress [Neck Appearance] : the appearance of the neck was normal [Abnormal Walk] : normal gait [Skin Color & Pigmentation] : normal skin color and pigmentation

## 2020-07-24 NOTE — HISTORY OF PRESENT ILLNESS
[de-identified] : 59 year old female with  PMH of fibromyalgia, psoriasis, and osteoporosis who presents for  follow up of right temporal meningioma presents referred by Dr. Smith. She was originally diagnosed 3 years ago after a slip and fall with an incidental meningioma. \par \par She does complain of mild headaches 2-3 times per week. She does take tylenol which helps. She has morning nausea 1-2 times weekly with vomiting but this is not associated with headache.

## 2020-07-24 NOTE — ASSESSMENT
[FreeTextEntry1] : My impression is that the patient suffers from an incidental meningioma.   The patient’s established problem of meningioma  is progressing and slowly increasing in size. I had a long discussion with the patient regarding the role of surgical resection in the context of documented growth and her young age.  The patient was extensively educated about the nature of her disease process. If she chooses to hold on surgery then I would recommend MRI in 6 months should she choose observation. She will get medical clearance.  I have explained the alternatives, risks and benefits to the patient and she understands and agrees to proceed.  This risk of the procedure including but not limited to pain, infection, seizure, stroke, recurrence, residual disease, neurovascular injury, heart attack, pulmonary embolism, blindness, weakness, paralysis and death have been carefully explained to the patient who clearly understands and agrees to proceed.\par

## 2020-07-28 LAB
CHOLEST SERPL-MCNC: 201 MG/DL
CHOLEST/HDLC SERPL: 3.4 RATIO
HDLC SERPL-MCNC: 59 MG/DL
LDLC SERPL CALC-MCNC: 114 MG/DL
LDLC SERPL DIRECT ASSAY-MCNC: 128 MG/DL
TRIGL SERPL-MCNC: 136 MG/DL

## 2020-07-29 ENCOUNTER — OUTPATIENT (OUTPATIENT)
Dept: OUTPATIENT SERVICES | Facility: HOSPITAL | Age: 60
LOS: 1 days | End: 2020-07-29
Payer: COMMERCIAL

## 2020-07-29 ENCOUNTER — APPOINTMENT (OUTPATIENT)
Dept: RADIOLOGY | Facility: CLINIC | Age: 60
End: 2020-07-29
Payer: COMMERCIAL

## 2020-07-29 DIAGNOSIS — K46.9 UNSPECIFIED ABDOMINAL HERNIA WITHOUT OBSTRUCTION OR GANGRENE: Chronic | ICD-10-CM

## 2020-07-29 DIAGNOSIS — Z00.8 ENCOUNTER FOR OTHER GENERAL EXAMINATION: ICD-10-CM

## 2020-07-29 PROCEDURE — 71046 X-RAY EXAM CHEST 2 VIEWS: CPT | Mod: 26

## 2020-07-29 PROCEDURE — 71046 X-RAY EXAM CHEST 2 VIEWS: CPT

## 2020-07-31 ENCOUNTER — APPOINTMENT (OUTPATIENT)
Dept: NEUROSURGERY | Facility: CLINIC | Age: 60
End: 2020-07-31

## 2020-08-14 ENCOUNTER — APPOINTMENT (OUTPATIENT)
Dept: CARDIOLOGY | Facility: CLINIC | Age: 60
End: 2020-08-14
Payer: COMMERCIAL

## 2020-08-14 PROCEDURE — 93306 TTE W/DOPPLER COMPLETE: CPT

## 2020-08-16 ENCOUNTER — RX RENEWAL (OUTPATIENT)
Age: 60
End: 2020-08-16

## 2020-08-17 ENCOUNTER — APPOINTMENT (OUTPATIENT)
Dept: DISASTER EMERGENCY | Facility: CLINIC | Age: 60
End: 2020-08-17

## 2020-08-19 ENCOUNTER — APPOINTMENT (OUTPATIENT)
Dept: CARDIOLOGY | Facility: CLINIC | Age: 60
End: 2020-08-19
Payer: COMMERCIAL

## 2020-08-19 ENCOUNTER — NON-APPOINTMENT (OUTPATIENT)
Age: 60
End: 2020-08-19

## 2020-08-19 VITALS
OXYGEN SATURATION: 100 % | HEART RATE: 72 BPM | BODY MASS INDEX: 25.78 KG/M2 | SYSTOLIC BLOOD PRESSURE: 110 MMHG | DIASTOLIC BLOOD PRESSURE: 72 MMHG | WEIGHT: 132 LBS | TEMPERATURE: 97.2 F

## 2020-08-19 DIAGNOSIS — Z82.49 FAMILY HISTORY OF ISCHEMIC HEART DISEASE AND OTHER DISEASES OF THE CIRCULATORY SYSTEM: ICD-10-CM

## 2020-08-19 DIAGNOSIS — D32.9 BENIGN NEOPLASM OF MENINGES, UNSPECIFIED: ICD-10-CM

## 2020-08-19 DIAGNOSIS — Z82.79 FAMILY HISTORY OF OTHER CONGENITAL MALFORMATIONS, DEFORMATIONS AND CHROMOSOMAL ABNORMALITIES: ICD-10-CM

## 2020-08-19 DIAGNOSIS — R20.2 PARESTHESIA OF SKIN: ICD-10-CM

## 2020-08-19 PROCEDURE — 93000 ELECTROCARDIOGRAM COMPLETE: CPT

## 2020-08-19 PROCEDURE — 99214 OFFICE O/P EST MOD 30 MIN: CPT

## 2020-08-19 NOTE — HISTORY OF PRESENT ILLNESS
[FreeTextEntry1] : She presents for a cardiac evaluation, as she is concerned about her risk of cardiovascular disease in light of her family history.  Her brother had an MI/stent at 55 years old, her 73 yo sister recently had a PCI, and 2 uncles had myocardial infarctions when they were each 60 years old.  Her mother and sister both have high blood pressure.\par Her blood pressure this morning with Dr. Wright was 144/79.  \par She has occasional palpitations, lasting several seconds.\par She denies chest pain and shortness of breath.  \par She is anticipating surgery in 1 month for a meningioma.

## 2020-08-19 NOTE — HISTORY OF PRESENT ILLNESS
[FreeTextEntry1] : Her blood pressures usually are 110/75-79.  At times, they could be as high as 145-150/78.\par She denies chest pain, shortness of breath, and palpitations.\par She notes a 5-6-month history of left arm pain, present most of the time but now getting worse, mainly at night and particularly while in bed, unrelated to exertion, and which gets worse when she moves her left upper extremity.\par She also notes numbness in both great toes.\par She is scheduled for resection of her meningioma early September; her PCP will be doing the medical clearance.

## 2020-08-26 ENCOUNTER — APPOINTMENT (OUTPATIENT)
Dept: SPINE | Facility: CLINIC | Age: 60
End: 2020-08-26

## 2020-08-27 ENCOUNTER — APPOINTMENT (OUTPATIENT)
Dept: ORTHOPEDIC SURGERY | Facility: CLINIC | Age: 60
End: 2020-08-27
Payer: COMMERCIAL

## 2020-08-27 VITALS
BODY MASS INDEX: 25.91 KG/M2 | HEART RATE: 84 BPM | DIASTOLIC BLOOD PRESSURE: 82 MMHG | HEIGHT: 60 IN | TEMPERATURE: 97.4 F | SYSTOLIC BLOOD PRESSURE: 127 MMHG | WEIGHT: 132 LBS

## 2020-08-27 DIAGNOSIS — M25.521 PAIN IN RIGHT ELBOW: ICD-10-CM

## 2020-08-27 DIAGNOSIS — M77.11 LATERAL EPICONDYLITIS, RIGHT ELBOW: ICD-10-CM

## 2020-08-27 PROCEDURE — 99214 OFFICE O/P EST MOD 30 MIN: CPT

## 2020-08-27 PROCEDURE — 73030 X-RAY EXAM OF SHOULDER: CPT | Mod: LT

## 2020-08-27 PROCEDURE — 73080 X-RAY EXAM OF ELBOW: CPT | Mod: RT

## 2020-09-06 ENCOUNTER — NON-APPOINTMENT (OUTPATIENT)
Age: 60
End: 2020-09-06

## 2020-10-15 ENCOUNTER — APPOINTMENT (OUTPATIENT)
Dept: NEUROLOGY | Facility: CLINIC | Age: 60
End: 2020-10-15
Payer: COMMERCIAL

## 2020-10-15 VITALS
DIASTOLIC BLOOD PRESSURE: 79 MMHG | HEART RATE: 78 BPM | SYSTOLIC BLOOD PRESSURE: 122 MMHG | WEIGHT: 138 LBS | BODY MASS INDEX: 27.09 KG/M2 | HEIGHT: 60 IN

## 2020-10-15 VITALS — TEMPERATURE: 97.2 F

## 2020-10-15 DIAGNOSIS — G54.9 NERVE ROOT AND PLEXUS DISORDER, UNSPECIFIED: ICD-10-CM

## 2020-10-15 DIAGNOSIS — Z87.39 PERSONAL HISTORY OF OTHER DISEASES OF THE MUSCULOSKELETAL SYSTEM AND CONNECTIVE TISSUE: ICD-10-CM

## 2020-10-15 DIAGNOSIS — D33.2 BENIGN NEOPLASM OF BRAIN, UNSPECIFIED: ICD-10-CM

## 2020-10-15 PROCEDURE — 99215 OFFICE O/P EST HI 40 MIN: CPT

## 2020-10-16 ENCOUNTER — APPOINTMENT (OUTPATIENT)
Dept: ORTHOPEDIC SURGERY | Facility: CLINIC | Age: 60
End: 2020-10-16
Payer: COMMERCIAL

## 2020-10-16 PROBLEM — D33.2 BRAIN TUMOR (BENIGN): Status: ACTIVE | Noted: 2017-01-31

## 2020-10-16 PROBLEM — G54.9 MYELORADICULOPATHY: Status: ACTIVE | Noted: 2020-10-16

## 2020-10-16 PROCEDURE — 72040 X-RAY EXAM NECK SPINE 2-3 VW: CPT

## 2020-10-16 PROCEDURE — 99214 OFFICE O/P EST MOD 30 MIN: CPT

## 2020-10-16 PROCEDURE — 72100 X-RAY EXAM L-S SPINE 2/3 VWS: CPT

## 2020-10-16 NOTE — REVIEW OF SYSTEMS
[Numbness] : numbness [Tingling] : tingling [As Noted in HPI] : as noted in HPI [Limb Pain] : limb pain [Arthralgias] : arthralgias [Joint Pain] : joint pain [Negative] : Endocrine

## 2020-10-16 NOTE — DISCUSSION/SUMMARY
[FreeTextEntry1] : Opinion–the patient has features of cervical myelopathy with bilateral hand numbness bilateral symmetric hyperreflexia and was advised MRI of the cervical spine and a prescription was provided.\par \par Electrodiagnostic studies will be appropriate for both upper extremities to rule out entrapment neuropathy and to see if there is any definite EMG evidence of cervical radiculopathy.\par \par Patient has chronic history of fibromyalgia psoriatic arthritis status post craniotomy for meningioma resection chronic back pain and all the aforementioned issues are being addressed by respective specialists.  I had a long conversation providing education and counseling and will appropriately advise her after MRI of the cervical spine and electrophysiologic testing.

## 2020-10-16 NOTE — HISTORY OF PRESENT ILLNESS
[FreeTextEntry1] : The patient is 60-year-old  female seeking neurological consultation and narrated the following history.\par \par The patient was evaluated by rheumatologist for chronic fibromyalgia and psoriatic arthritis and treated with gabapentin 2400 mg daily and has no side effects and has been pretty effective and is currently on cosantyx.  The arthritic disease has been under excellent control but requires pain management by Dr. Gideon Story and has been taking tramadol and occasional OxyContin and has good effect on pain control.\par \par She also has chronic low back pain and had epidural injections by the pain management with significant improvement.\par \par Current complaints the patient stated that she has tingling and numbness in both hands affecting all fingers mostly on the left side but occasionally on the right side since last 5 to 6 months but not associated with any focal motor weakness and there has not been any radicular symptoms from the neck but feels that the neck is stiff and part of the neck discomfort is that of fibromyalgia without any radicular component\par \par She had history of small meningioma in the right frontal area which was resected in September 2020 completely and has only local craniotomy discomfort this was incidentally discovered when she fell and MRI revealed a meningioma which was asymptomatic before.\par \par Past medical history is pertinent for right knee replacement, craniotomy fibromyalgia rheumatological illness with psoriatic arthritis treated with immunotherapy resection of meningioma.\par \par She has no toxic habits and wants to try CBD oil and was advised to contact Dr. Miller who is authorized to prescribe the medication after appropriate evaluation.  She is  with 2 grownup children and there is no history of COVID.\par \par  I reviewed her medications and allergies

## 2020-10-16 NOTE — DATA REVIEWED
[de-identified] : I reviewed her electronic medical records and noted that she was evaluated by Dr. Mota in 2018 for burning tongue syndrome who also noted history of fibromyalgia and advised her treatment with nortriptyline.  There has not been any follow-up ever since.  There are extensive medical records of other consultants without any significant neurological pertinence other than intracranial meningioma.

## 2020-10-16 NOTE — PHYSICAL EXAM
[FreeTextEntry1] : General examination is unremarkable.  Her height is 5 feet and weight is 138 pounds.  Head neck, ear nose and throat is unremarkable.  There is no carotid bruit, thyromegaly or lymphadenopathy.  Chest is clear and heart sounds are normal.  Abdomen is soft and there is no tenderness.  Pedal pulsations are normal and there is no leg edema.  There is no skin rash but nails are With ridges typical of psoriatic nails.  There is no joint swelling significant restriction and lumbar spine is without tenderness or muscle spasm.  Cervical spine range of motion is mildly restricted in all directions with a stiff feeling and pain but no radicular symptoms.\par \par There are no meningeal signs.\par \par Neurologically memory language cognitive and behavioral functions are normal and is very pleasant cooperative and appears to have good insight and judgment.  Cranial nerves III normal disks brisk pupils full extraocular movements no nystagmus no facial weakness hearing and bulbar functions are intact there is no longer any burning sensation in the tongue and there is no scalp sensory changes.  Temporal artery pulsations are normal.\par \par Motor tone is preserved without any hypotonia or spasticity and muscle strength is symmetric 5/5 but she tends to give way with +4/5 strength in almost all muscles without focal motor weakness and there is no atrophy fasciculation or abnormal movements but normal coordination.  Deep tendon reflexes are 3+ bilaterally without Babinski sign with positive Daniel.  Chart check is normal.  Gait is normal and Romberg sign is negative.  Sensations are slightly decreased in the left forearm and hand in a nondermatomal distribution but her arm and neck leg is normal and the right upper and both lower extremity sensations are preserved.  Posterior column sensations are normal and there is no ataxia.

## 2020-10-25 ENCOUNTER — APPOINTMENT (OUTPATIENT)
Dept: MRI IMAGING | Facility: CLINIC | Age: 60
End: 2020-10-25
Payer: COMMERCIAL

## 2020-10-25 ENCOUNTER — RESULT REVIEW (OUTPATIENT)
Age: 60
End: 2020-10-25

## 2020-10-25 ENCOUNTER — OUTPATIENT (OUTPATIENT)
Dept: OUTPATIENT SERVICES | Facility: HOSPITAL | Age: 60
LOS: 1 days | End: 2020-10-25
Payer: COMMERCIAL

## 2020-10-25 DIAGNOSIS — M47.812 SPONDYLOSIS WITHOUT MYELOPATHY OR RADICULOPATHY, CERVICAL REGION: ICD-10-CM

## 2020-10-25 DIAGNOSIS — K46.9 UNSPECIFIED ABDOMINAL HERNIA WITHOUT OBSTRUCTION OR GANGRENE: Chronic | ICD-10-CM

## 2020-10-25 PROCEDURE — 72146 MRI CHEST SPINE W/O DYE: CPT | Mod: 26

## 2020-10-25 PROCEDURE — 72141 MRI NECK SPINE W/O DYE: CPT | Mod: 26

## 2020-10-25 PROCEDURE — 72146 MRI CHEST SPINE W/O DYE: CPT

## 2020-10-25 PROCEDURE — 72148 MRI LUMBAR SPINE W/O DYE: CPT

## 2020-10-25 PROCEDURE — 72148 MRI LUMBAR SPINE W/O DYE: CPT | Mod: 26

## 2020-10-25 PROCEDURE — 72141 MRI NECK SPINE W/O DYE: CPT

## 2020-10-26 ENCOUNTER — APPOINTMENT (OUTPATIENT)
Dept: NEUROLOGY | Facility: CLINIC | Age: 60
End: 2020-10-26
Payer: COMMERCIAL

## 2020-10-26 VITALS — TEMPERATURE: 96.7 F

## 2020-10-26 PROCEDURE — 95911 NRV CNDJ TEST 9-10 STUDIES: CPT

## 2020-10-26 PROCEDURE — 95885 MUSC TST DONE W/NERV TST LIM: CPT

## 2020-10-26 PROCEDURE — 99072 ADDL SUPL MATRL&STAF TM PHE: CPT

## 2020-11-02 ENCOUNTER — APPOINTMENT (OUTPATIENT)
Dept: ORTHOPEDIC SURGERY | Facility: CLINIC | Age: 60
End: 2020-11-02
Payer: COMMERCIAL

## 2020-11-02 PROCEDURE — 99214 OFFICE O/P EST MOD 30 MIN: CPT | Mod: 95

## 2020-11-03 ENCOUNTER — APPOINTMENT (OUTPATIENT)
Dept: NEUROLOGY | Facility: CLINIC | Age: 60
End: 2020-11-03
Payer: COMMERCIAL

## 2020-11-03 VITALS
BODY MASS INDEX: 27.09 KG/M2 | HEART RATE: 78 BPM | WEIGHT: 138 LBS | TEMPERATURE: 97.9 F | HEIGHT: 60 IN | DIASTOLIC BLOOD PRESSURE: 76 MMHG | SYSTOLIC BLOOD PRESSURE: 117 MMHG

## 2020-11-03 PROCEDURE — 95909 NRV CNDJ TST 5-6 STUDIES: CPT

## 2020-11-03 PROCEDURE — 95886 MUSC TEST DONE W/N TEST COMP: CPT

## 2020-11-03 PROCEDURE — 99072 ADDL SUPL MATRL&STAF TM PHE: CPT

## 2020-11-05 ENCOUNTER — APPOINTMENT (OUTPATIENT)
Dept: ORTHOPEDIC SURGERY | Facility: CLINIC | Age: 60
End: 2020-11-05
Payer: COMMERCIAL

## 2020-11-05 DIAGNOSIS — M77.8 OTHER ENTHESOPATHIES, NOT ELSEWHERE CLASSIFIED: ICD-10-CM

## 2020-11-05 PROCEDURE — 20605 DRAIN/INJ JOINT/BURSA W/O US: CPT | Mod: LT

## 2020-11-05 PROCEDURE — 99072 ADDL SUPL MATRL&STAF TM PHE: CPT

## 2020-11-05 PROCEDURE — 99213 OFFICE O/P EST LOW 20 MIN: CPT | Mod: 25

## 2020-11-10 ENCOUNTER — APPOINTMENT (OUTPATIENT)
Dept: ORTHOPEDIC SURGERY | Facility: CLINIC | Age: 60
End: 2020-11-10
Payer: COMMERCIAL

## 2020-11-10 DIAGNOSIS — M48.07 SPINAL STENOSIS, LUMBOSACRAL REGION: ICD-10-CM

## 2020-11-10 PROCEDURE — 99072 ADDL SUPL MATRL&STAF TM PHE: CPT

## 2020-11-10 PROCEDURE — 99214 OFFICE O/P EST MOD 30 MIN: CPT

## 2020-11-12 ENCOUNTER — APPOINTMENT (OUTPATIENT)
Dept: ORTHOPEDIC SURGERY | Facility: CLINIC | Age: 60
End: 2020-11-12
Payer: COMMERCIAL

## 2020-11-12 VITALS
SYSTOLIC BLOOD PRESSURE: 113 MMHG | HEART RATE: 85 BPM | DIASTOLIC BLOOD PRESSURE: 76 MMHG | HEIGHT: 60 IN | WEIGHT: 135 LBS | BODY MASS INDEX: 26.5 KG/M2

## 2020-11-12 PROCEDURE — 20526 THER INJECTION CARP TUNNEL: CPT | Mod: RT

## 2020-11-12 PROCEDURE — 99072 ADDL SUPL MATRL&STAF TM PHE: CPT

## 2020-11-12 PROCEDURE — 99204 OFFICE O/P NEW MOD 45 MIN: CPT | Mod: 25

## 2020-11-12 NOTE — PROCEDURE
[de-identified] : Injection: Right wrist.\par Indication: Carpal Tunnel Syndrome.\par \par A discussion was had with the patient regarding this procedure and all questions were answered. All risks, benefits and alternatives were discussed. These included but were not limited to bleeding, infection, and allergic reaction. A timeout was performed prior to the procedure to ensure proper side.  Alcohol was used to clean and sterilize the skin over the volar aspect of the MCP. A 25-gauge needle was used to inject 0.5cc of 0.25% bupivacaine and 1cc of 6mg/mL betamethasone into the carpal tunnel from a volar ulnar approach with the wrist in flexion. A sterile bandage was then applied. The patient tolerated the procedure well and there were no complications. \par \par \par

## 2020-11-12 NOTE — DISCUSSION/SUMMARY
[de-identified] : Discussed findings of today's exam and possible causes of patient's pain.  Educated patient on their most probable diagnosis of bilateral carpal tunnel syndrome; right more symptomatic than left today.  Reviewed possible courses of treatment, and we collaboratively decided best course of treatment at this time will include right carpal tunnel cortisone injection today (see procedure note).  Informed the patient that the numbing medicine in today's injection will last for about 4-6 hours. The steroid that was injected will start to work in 1 to 2 days, peak at 1-2 weeks, and may last up to 4-6 weeks. Discussed with the patient the expected course of this injury, and the variable response to cortisone injections. Sometimes it is relieved with a single injection, while others require repeat injections.  We will wait and see how patient responds to this cortisone injection, may consider repeat injection in 6 weeks if issue is improved but not fully resolved.  If patient has good relief from today's injection into the right wrist, she may consider following up as needed for left wrist carpal tunnel injection, she wished to defer the left injection today.  Patient may consider consultation with hand/wrist surgeon to have a discussion about repeat cortisone injections, versus possible surgical intervention.  Patient advised to wear a wrist splint every evening and as tolerate during the day.  Based on the patient's symptoms today, no hand/wrist therapy will likely be needed.  Patient appreciates and agrees with current plan.\par \par This note was generated using dragon medical dictation software.  A reasonable effort has been made for proofreading its contents, but typos may still remain.  If there are any questions or points of clarification needed please notify my office.

## 2020-11-12 NOTE — CONSULT LETTER
[Dear  ___] : Dear  [unfilled], [Consult Letter:] : I had the pleasure of evaluating your patient, [unfilled]. [Please see my note below.] : Please see my note below. [Sincerely,] : Sincerely, [FreeTextEntry2] : Neuro [FreeTextEntry3] : Yasir Horne DO, ATC\par Primary Care Sports Medicine\par [facility]

## 2020-11-12 NOTE — PHYSICAL EXAM
[de-identified] : Constitutional: Well-nourished, well-developed, No acute distress\par Respiratory:  Good respiratory effort, no SOB\par Lymphatic: No regional lymphadenopathy, no lymphedema\par Psychiatric: Pleasant and normal affect, alert and oriented x3\par Skin: Clean dry and intact B/L UE/LE\par Musculoskeletal: normal except where as noted in regional exam\par \par Cervical Spine Exam\par APPEARANCE: no marked deformities or malalignment, normal curvature, good posture\par ROM: full & painless in all planes\par B/L Shoulders:  No asymmetry, malalignment, or swelling, Full ROM, 5/5 strength in flexion/ext, Abd/Add, IR/ER, Joints stable\par B/L Elbows:  No asymmetry, malalignment, or swelling, Full ROM, 5/5 strength in flex/ext, pronation/supination, Joints stable\par \par Right Wrist:\par APPEARANCE: No swelling, no marked deformities or malalignment\par POSITIVE TENDERNESS: Mild tenderness of the volar wrists with palpation of the transverse carpal ligament\par NONTENDER: snuffbox, scapholunate, TFCC, radial styloid, CMC, and MCP joints.\par ROM: full & painless. \par RESISTIVE TESTING: painless resisted wrist flex/ext, and radial/ulnar deviation. \par SPECIAL TESTS: + Tinel's at the carpal tunnel, + Durkan test, + Phalen's, neg Finkelstein's. neg Moses's. neg daiana test. neg TFCC grind. \par Vasc: 2+ radial pulse\par Neuro: AIN, PIN, Ulnar nerve intact to motor\par Sensation: Mild decreased sensation in the distribution of the median nerve, Intact to light touch throughout \par \par Left Wrist:\par APPEARANCE: No swelling, no marked deformities or malalignment\par POSITIVE TENDERNESS: Mild tenderness of the volar wrists with palpation of the transverse carpal ligament\par NONTENDER: snuffbox, scapholunate, TFCC, radial styloid, CMC, and MCP joints.\par ROM: full & painless. \par RESISTIVE TESTING: painless resisted wrist flex/ext, and radial/ulnar deviation. \par SPECIAL TESTS: + Tinel's at the carpal tunnel, + Durkan test, + Phalen's, neg Finkelstein's. neg Moses's. neg daiana test. neg TFCC grind. \par Vasc: 2+ radial pulse\par Neuro: AIN, PIN, Ulnar nerve intact to motor\par Sensation: Mild decreased sensation in the distribution of the median nerve, Intact to light touch throughout

## 2020-11-12 NOTE — HISTORY OF PRESENT ILLNESS
[de-identified] : RHD Patient is here for bilateral hand pain, R>L, that began about 6 months ago. There was no inciting injury. Pain is worse at night. She complains of N/T. She had an EMG done which revealed bilateral carpal tunnel syndrome. She takes Ibuprofen, Tylenol, Tramadol, Oxycodone which is prescribed for other issues. She has used wrist braces. She had surgery performed about 15 years ago for right carpal tunnel.  \par \par The patient's past medical history, past surgical history, medications and allergies were reviewed by me today and documented accordingly. In addition, the patient's family and social history, which were noncontributory to this visit, were reviewed also. Intake form was reviewed. The patient has no family history of arthritis.

## 2020-11-30 ENCOUNTER — APPOINTMENT (OUTPATIENT)
Dept: RHEUMATOLOGY | Facility: CLINIC | Age: 60
End: 2020-11-30
Payer: COMMERCIAL

## 2020-11-30 VITALS
TEMPERATURE: 97.3 F | OXYGEN SATURATION: 97 % | DIASTOLIC BLOOD PRESSURE: 80 MMHG | HEART RATE: 73 BPM | SYSTOLIC BLOOD PRESSURE: 126 MMHG | WEIGHT: 131 LBS | BODY MASS INDEX: 25.72 KG/M2 | HEIGHT: 60 IN

## 2020-11-30 PROCEDURE — 90686 IIV4 VACC NO PRSV 0.5 ML IM: CPT

## 2020-11-30 PROCEDURE — 99215 OFFICE O/P EST HI 40 MIN: CPT | Mod: 25

## 2020-11-30 PROCEDURE — G0008: CPT

## 2020-11-30 PROCEDURE — 99072 ADDL SUPL MATRL&STAF TM PHE: CPT

## 2020-12-04 ENCOUNTER — NON-APPOINTMENT (OUTPATIENT)
Age: 60
End: 2020-12-04

## 2020-12-08 ENCOUNTER — APPOINTMENT (OUTPATIENT)
Dept: PAIN MANAGEMENT | Facility: CLINIC | Age: 60
End: 2020-12-08

## 2021-01-25 ENCOUNTER — APPOINTMENT (OUTPATIENT)
Dept: RHEUMATOLOGY | Facility: CLINIC | Age: 61
End: 2021-01-25
Payer: COMMERCIAL

## 2021-01-25 VITALS
OXYGEN SATURATION: 98 % | DIASTOLIC BLOOD PRESSURE: 83 MMHG | TEMPERATURE: 97.5 F | SYSTOLIC BLOOD PRESSURE: 123 MMHG | HEART RATE: 73 BPM | BODY MASS INDEX: 25.32 KG/M2 | HEIGHT: 60 IN | WEIGHT: 129 LBS

## 2021-01-25 PROCEDURE — 99072 ADDL SUPL MATRL&STAF TM PHE: CPT

## 2021-01-25 PROCEDURE — 99213 OFFICE O/P EST LOW 20 MIN: CPT

## 2021-01-25 RX ORDER — TRAMADOL HYDROCHLORIDE 50 MG/1
50 TABLET, COATED ORAL
Refills: 0 | Status: DISCONTINUED | COMMUNITY
End: 2021-01-25

## 2021-01-25 RX ORDER — GOLIMUMAB 50 MG/.5ML
50 INJECTION, SOLUTION SUBCUTANEOUS
Qty: 3 | Refills: 3 | Status: DISCONTINUED | COMMUNITY
Start: 2017-01-20 | End: 2021-01-25

## 2021-01-25 RX ORDER — MILNACIPRAN HYDROCHLORIDE 12.5-25-5
12.5 & 25 & 5 KIT ORAL
Qty: 1 | Refills: 0 | Status: DISCONTINUED | COMMUNITY
Start: 2020-04-24 | End: 2021-01-25

## 2021-01-25 RX ORDER — MILNACIPRAN HYDROCHLORIDE 50 MG/1
50 TABLET, FILM COATED ORAL
Qty: 60 | Refills: 2 | Status: DISCONTINUED | COMMUNITY
Start: 2020-05-16 | End: 2021-01-25

## 2021-01-25 RX ORDER — MILNACIPRAN HYDROCHLORIDE 50 MG/1
50 TABLET, FILM COATED ORAL
Qty: 60 | Refills: 2 | Status: DISCONTINUED | COMMUNITY
Start: 2020-08-17 | End: 2021-01-25

## 2021-02-01 LAB
ALBUMIN SERPL ELPH-MCNC: 4.8 G/DL
ALP BLD-CCNC: 60 U/L
ALT SERPL-CCNC: 13 U/L
ANION GAP SERPL CALC-SCNC: 14 MMOL/L
AST SERPL-CCNC: 14 U/L
BASOPHILS # BLD AUTO: 0.04 K/UL
BASOPHILS NFR BLD AUTO: 0.6 %
BILIRUB SERPL-MCNC: 0.4 MG/DL
BUN SERPL-MCNC: 14 MG/DL
CALCIUM SERPL-MCNC: 9.7 MG/DL
CHLORIDE SERPL-SCNC: 104 MMOL/L
CO2 SERPL-SCNC: 24 MMOL/L
CREAT SERPL-MCNC: 0.93 MG/DL
CRP SERPL-MCNC: <0.1 MG/DL
EOSINOPHIL # BLD AUTO: 0.1 K/UL
EOSINOPHIL NFR BLD AUTO: 1.6 %
ERYTHROCYTE [SEDIMENTATION RATE] IN BLOOD BY WESTERGREN METHOD: 3 MM/HR
GLUCOSE SERPL-MCNC: 84 MG/DL
HBV CORE IGG+IGM SER QL: NONREACTIVE
HBV SURFACE AB SER QL: NONREACTIVE
HBV SURFACE AG SER QL: NONREACTIVE
HCT VFR BLD CALC: 42.7 %
HCV AB SER QL: NONREACTIVE
HCV S/CO RATIO: 0.11 S/CO
HGB BLD-MCNC: 14.3 G/DL
IMM GRANULOCYTES NFR BLD AUTO: 0.2 %
LYMPHOCYTES # BLD AUTO: 2.88 K/UL
LYMPHOCYTES NFR BLD AUTO: 46.8 %
M TB IFN-G BLD-IMP: NEGATIVE
MAN DIFF?: NORMAL
MCHC RBC-ENTMCNC: 29.8 PG
MCHC RBC-ENTMCNC: 33.5 GM/DL
MCV RBC AUTO: 89 FL
MONOCYTES # BLD AUTO: 0.47 K/UL
MONOCYTES NFR BLD AUTO: 7.6 %
NEUTROPHILS # BLD AUTO: 2.66 K/UL
NEUTROPHILS NFR BLD AUTO: 43.2 %
PLATELET # BLD AUTO: 240 K/UL
POTASSIUM SERPL-SCNC: 4.6 MMOL/L
PROT SERPL-MCNC: 6.8 G/DL
QUANTIFERON TB PLUS MITOGEN MINUS NIL: 9.2 IU/ML
QUANTIFERON TB PLUS NIL: 0.06 IU/ML
QUANTIFERON TB PLUS TB1 MINUS NIL: 0 IU/ML
QUANTIFERON TB PLUS TB2 MINUS NIL: 0 IU/ML
RBC # BLD: 4.8 M/UL
RBC # FLD: 12.3 %
SODIUM SERPL-SCNC: 142 MMOL/L
WBC # FLD AUTO: 6.16 K/UL

## 2021-02-03 ENCOUNTER — APPOINTMENT (OUTPATIENT)
Dept: CT IMAGING | Facility: CLINIC | Age: 61
End: 2021-02-03
Payer: COMMERCIAL

## 2021-02-03 ENCOUNTER — OUTPATIENT (OUTPATIENT)
Dept: OUTPATIENT SERVICES | Facility: HOSPITAL | Age: 61
LOS: 1 days | End: 2021-02-03
Payer: COMMERCIAL

## 2021-02-03 DIAGNOSIS — K46.9 UNSPECIFIED ABDOMINAL HERNIA WITHOUT OBSTRUCTION OR GANGRENE: Chronic | ICD-10-CM

## 2021-02-03 DIAGNOSIS — K59.09 OTHER CONSTIPATION: ICD-10-CM

## 2021-02-03 PROCEDURE — 74176 CT ABD & PELVIS W/O CONTRAST: CPT

## 2021-02-03 PROCEDURE — 74176 CT ABD & PELVIS W/O CONTRAST: CPT | Mod: 26

## 2021-03-03 ENCOUNTER — APPOINTMENT (OUTPATIENT)
Dept: ENDOCRINOLOGY | Facility: CLINIC | Age: 61
End: 2021-03-03
Payer: COMMERCIAL

## 2021-03-03 ENCOUNTER — APPOINTMENT (OUTPATIENT)
Dept: NEUROLOGY | Facility: CLINIC | Age: 61
End: 2021-03-03

## 2021-03-03 VITALS
TEMPERATURE: 98 F | HEIGHT: 60 IN | OXYGEN SATURATION: 98 % | HEART RATE: 84 BPM | DIASTOLIC BLOOD PRESSURE: 74 MMHG | BODY MASS INDEX: 26.11 KG/M2 | WEIGHT: 133 LBS | SYSTOLIC BLOOD PRESSURE: 110 MMHG

## 2021-03-03 PROCEDURE — 99072 ADDL SUPL MATRL&STAF TM PHE: CPT

## 2021-03-03 PROCEDURE — 99214 OFFICE O/P EST MOD 30 MIN: CPT | Mod: 25

## 2021-03-03 NOTE — PROCEDURE
[FreeTextEntry1] : Thyroid ultrasound March 3, 2021\par Right thyroid nodule: \par 15 x 15 x 20 mm nodule no prior for comparison\par \par Bone mineral density: 02/04/2020 \par Indication: vs. 2019\par Spine: -2.2 osteopenia, no significant change\par Total hip: -2.2 osteopenia, no significant change\par Femoral neck: -2.6 osteoporosis, no significant change\par Proximal radius: -1.4 osteopenia, no significant change\par \par Bone mineral density: 02/21/2019 \par Indication: vs. 2017 \par Spine: -2.2 osteopenia (+8.0%)\par Total hip: -2.3 osteopenia (+4.9%)\par Femoral neck: -2.6 osteoporosis, no significant change \par Proximal radius: -1.3 osteopenia, no significant change \par

## 2021-03-03 NOTE — PHYSICAL EXAM
[Alert] : alert [Normal Sclera/Conjunctiva] : normal sclera/conjunctiva [No Neck Mass] : no neck mass was observed [No Respiratory Distress] : no respiratory distress [Normal S1, S2] : normal S1 and S2 [Regular Rhythm] : with a regular rhythm [Soft] : abdomen soft [No CVA Tenderness] : no ~M costovertebral angle tenderness [No Spinal Tenderness] : no spinal tenderness [Spine Straight] : spine straight [Oriented x3] : oriented to person, place, and time [Kyphosis] : no kyphosis present [Scoliosis] : no scoliosis [de-identified] : hoarse voice

## 2021-03-03 NOTE — HISTORY OF PRESENT ILLNESS
[Prolia (Denosumab)] : Prolia (Denosumab) [Calcium (supplements)] : calcium from a dietary supplement [Taking Steroids] : a history of taking steroids [Regular Dental Follow-Up] : regular dental follow-up [Calcium (dietary)] : dietary Calcium [Denosumab (Prolia)] : Denosumab [Premat. Menopause (surgery)] : a history of premature surgical menopause [Family History of Osteoporosis] : family history of osteoporosis [Vitamin D (oral)] : Vitamin D orally [FreeTextEntry1] :  \par \par Pt was first told of low bone density ~2013. Pt was being treated by PCP. She previously took an oral bisphosphonates and had severe UGI sx. She had endoscopies, no ulcers found. BMD Nov 2017 vs 2014, spine: -2.8, osteoporosis ( -11.4%) Total hip: -2.5 osteoporosis (-9.6%) Fem neck: -2.8 osteoporosis (-6.8%) Proximal wrist: -1.1 osteopenia (-3.4%). Pt was then put on Prolia for 2 doses (last ~June 2018). She states on Prolia she has severe myalgias. No h/o fx. FHx of osteoporosis (mother and sister). Pt had hysterectomy at 42 due to fibroids and went through menopause ~ 52. No HRT. No hot flashes. She gets moderate amounts of dietary Ca and takes Ca. BMD 2/2019 indicates improvement in spine and tot hip, only fem neck consistent with osteoporosis (likely due to Prolia). Pt had first dose of IV Reclast 3/2019.  Patient reports that she did not receive Reclast in 2020  , delayed next dose\par \par Pt has fibromyalgia; previously on long term Prednisone for 6 mons in 2018. She then d/c and more recently restarted for her pain. c/o joint pain and pt can not walk well due to knee pain. Pt does not exercise. She states she recently gained wt despite no change in health habits.\par \par Pt had RKR 6/2019. No complications. Pt still has some discomfort from procedure. Pt still ambulates w/ cane.\par \par Endocrine hx notable for R thyroid nodule, FNA biopsy benign with Dr. Shahriar Strauss. No dysphagia or neck pain. No sx of hyper or hypothyroidism. \par \par Patient also states that she went through a surgery for meningioma, the procedure went well w/o any complications.\par Patient denies thigh pain, interval fracture, kidney stones, shaking , racing of heart, significant weight changes. Pt had APR. No ONJ. [Low Calcium Intake] : no past or present history of low calcium intake [Low Vit D Intake/Exposure] : no past or present history of low vitamin D intake [High Fall Risk] : no fall risk [Frequent Falls] : no frequent falls [Uses a Walker/Cane] : no use of a walker/cane [Family History of Breast Cancer] : no family history of breast cancer [Family History of Hip Fracture] : no family history of hip fracture [Hyperparathyroidism] : no hyperparathyroidism [History of Radiation Therapy] : no history of radiation therapy [History of Blood Clots] : no history of blood clots [Previous Fragility Fracture] : no previous fragility fracture

## 2021-03-03 NOTE — ASSESSMENT
[Bisphosphonates] : The patient was instructed to take bisphosphonates on an empty stomach with a full glass of water,and wait at least 30 minutes before eating or lying down [Bisphosphonate Therapy] : Risks and benefits of bisphosphonate therapy were  discussed with the patient including gastroesophageal irritation, osteonecrosis of the jaw, and atypical femur fractures, and acute phase reaction [FreeTextEntry1] : Zoledronic Acid

## 2021-03-08 ENCOUNTER — APPOINTMENT (OUTPATIENT)
Dept: RHEUMATOLOGY | Facility: CLINIC | Age: 61
End: 2021-03-08
Payer: COMMERCIAL

## 2021-03-08 VITALS
SYSTOLIC BLOOD PRESSURE: 122 MMHG | OXYGEN SATURATION: 98 % | TEMPERATURE: 96.2 F | WEIGHT: 132 LBS | DIASTOLIC BLOOD PRESSURE: 85 MMHG | BODY MASS INDEX: 25.91 KG/M2 | HEART RATE: 74 BPM | HEIGHT: 60 IN

## 2021-03-08 DIAGNOSIS — Z71.89 OTHER SPECIFIED COUNSELING: ICD-10-CM

## 2021-03-08 PROCEDURE — 99215 OFFICE O/P EST HI 40 MIN: CPT

## 2021-03-08 PROCEDURE — 99072 ADDL SUPL MATRL&STAF TM PHE: CPT

## 2021-03-20 ENCOUNTER — RX RENEWAL (OUTPATIENT)
Age: 61
End: 2021-03-20

## 2021-03-20 ENCOUNTER — APPOINTMENT (OUTPATIENT)
Dept: RHEUMATOLOGY | Facility: CLINIC | Age: 61
End: 2021-03-20
Payer: COMMERCIAL

## 2021-03-20 PROCEDURE — 99072 ADDL SUPL MATRL&STAF TM PHE: CPT

## 2021-03-20 PROCEDURE — 96374 THER/PROPH/DIAG INJ IV PUSH: CPT

## 2021-04-01 ENCOUNTER — RX RENEWAL (OUTPATIENT)
Age: 61
End: 2021-04-01

## 2021-05-10 ENCOUNTER — APPOINTMENT (OUTPATIENT)
Dept: RHEUMATOLOGY | Facility: CLINIC | Age: 61
End: 2021-05-10
Payer: COMMERCIAL

## 2021-05-10 VITALS
HEIGHT: 60 IN | TEMPERATURE: 97.1 F | DIASTOLIC BLOOD PRESSURE: 76 MMHG | SYSTOLIC BLOOD PRESSURE: 121 MMHG | WEIGHT: 133 LBS | OXYGEN SATURATION: 98 % | BODY MASS INDEX: 26.11 KG/M2 | HEART RATE: 72 BPM

## 2021-05-10 PROCEDURE — 99215 OFFICE O/P EST HI 40 MIN: CPT

## 2021-05-10 PROCEDURE — 99072 ADDL SUPL MATRL&STAF TM PHE: CPT

## 2021-05-10 RX ORDER — GABAPENTIN 400 MG/1
400 CAPSULE ORAL
Refills: 0 | Status: DISCONTINUED | COMMUNITY
End: 2021-05-10

## 2021-05-12 LAB
25(OH)D3 SERPL-MCNC: 142 NG/ML
25(OH)D3 SERPL-MCNC: 146 NG/ML
ALBUMIN SERPL ELPH-MCNC: 4.6 G/DL
ALP BLD-CCNC: 60 U/L
ALT SERPL-CCNC: 12 U/L
ANION GAP SERPL CALC-SCNC: 12 MMOL/L
AST SERPL-CCNC: 15 U/L
BASOPHILS # BLD AUTO: 0.04 K/UL
BASOPHILS NFR BLD AUTO: 0.5 %
BILIRUB SERPL-MCNC: 0.3 MG/DL
BUN SERPL-MCNC: 19 MG/DL
CALCIUM SERPL-MCNC: 9.3 MG/DL
CHLORIDE SERPL-SCNC: 106 MMOL/L
CHOLEST SERPL-MCNC: 208 MG/DL
CO2 SERPL-SCNC: 25 MMOL/L
CREAT SERPL-MCNC: 0.73 MG/DL
CRP SERPL-MCNC: <3 MG/L
EOSINOPHIL # BLD AUTO: 0.1 K/UL
EOSINOPHIL NFR BLD AUTO: 1.2 %
ERYTHROCYTE [SEDIMENTATION RATE] IN BLOOD BY WESTERGREN METHOD: 3 MM/HR
GLUCOSE SERPL-MCNC: 88 MG/DL
HCT VFR BLD CALC: 43.5 %
HDLC SERPL-MCNC: 64 MG/DL
HGB BLD-MCNC: 14.1 G/DL
IMM GRANULOCYTES NFR BLD AUTO: 0.4 %
LDLC SERPL CALC-MCNC: 114 MG/DL
LYMPHOCYTES # BLD AUTO: 3.57 K/UL
LYMPHOCYTES NFR BLD AUTO: 43 %
MAN DIFF?: NORMAL
MCHC RBC-ENTMCNC: 30.4 PG
MCHC RBC-ENTMCNC: 32.4 GM/DL
MCV RBC AUTO: 93.8 FL
MONOCYTES # BLD AUTO: 0.69 K/UL
MONOCYTES NFR BLD AUTO: 8.3 %
NEUTROPHILS # BLD AUTO: 3.88 K/UL
NEUTROPHILS NFR BLD AUTO: 46.6 %
NONHDLC SERPL-MCNC: 144 MG/DL
PLATELET # BLD AUTO: 219 K/UL
POTASSIUM SERPL-SCNC: 4.6 MMOL/L
PROT SERPL-MCNC: 6.9 G/DL
RBC # BLD: 4.64 M/UL
RBC # FLD: 13.4 %
SODIUM SERPL-SCNC: 142 MMOL/L
TRIGL SERPL-MCNC: 150 MG/DL
TSH SERPL-ACNC: 0.72 UIU/ML
WBC # FLD AUTO: 8.31 K/UL

## 2021-05-19 LAB — IGE SER-MCNC: 15 KU/L

## 2021-06-10 ENCOUNTER — APPOINTMENT (OUTPATIENT)
Dept: PAIN MANAGEMENT | Facility: CLINIC | Age: 61
End: 2021-06-10
Payer: COMMERCIAL

## 2021-06-10 VITALS
HEIGHT: 60 IN | BODY MASS INDEX: 26.9 KG/M2 | SYSTOLIC BLOOD PRESSURE: 121 MMHG | DIASTOLIC BLOOD PRESSURE: 81 MMHG | WEIGHT: 137 LBS | HEART RATE: 75 BPM

## 2021-06-10 PROCEDURE — 99072 ADDL SUPL MATRL&STAF TM PHE: CPT

## 2021-06-10 PROCEDURE — 99205 OFFICE O/P NEW HI 60 MIN: CPT

## 2021-06-10 NOTE — PHYSICAL EXAM
[FreeTextEntry1] : Constitutional: No signs of distress. No signs of toxicity. \par MS: Alert and well oriented. Speech fluent. No aphasia. Fund of knowledge intact. \par Psychiatric: Mood stable.\par CN: PERRLA: No papilledema; No VFC: No Kayla. V1-3 intact. No facial asymmetry. palate elevates symmetrically, tongue midline\par Motor: Adequate bulk, tone, strength. 5/5 strength\par DTR: present and symmetrical; no clonus\par Sensory: intact to primary and secondary modalities\par Cerebellar and gait: intact\par Eyes: no redness or swelling\par HEENT: intact\par Neck: No masses noted\par Pulmonary: no respiratory distress\par Vascular: no temperature,color changes; no edema\par Musculoskeletal: Diffuse tenderness of cervical, thoracic and lumbar paraspinal region. Range of motion of the cervical lumbar spine full upon flexion, extension and lateral rotation. Negative facet tenderness, Negative Spurlings bilaterally. Negative BEBA, negative SLRT bilaterally,\par Skin: No rash.\par

## 2021-06-10 NOTE — ASSESSMENT
[FreeTextEntry1] : This a case of a 60-year-old woman with a 30+ year history of chronic widespread pain consistent with fibromyalgia.  She does describe arthritis but I am not sure of the exact type being followed by rheumatology.  Her examination is essentially nonfocal with the exception of diffuse tender points of the cervical thoracic and lumbar spine.\par \par We discussed various treatment options specifically discussing the use of medical marijuana.  She wants to try medical marijuana for the treatment of her chronic pain.  She has no contraindications, e.g. psychosis or irregular heartbeat.  We performed a urinary drug screen today.  She will follow up with my nurse practitioner Yolanda in 2 weeks to review the urine drug screen obtain the letter of recommendation and discuss more specifics regarding the use of medicinal marijuana, risks and benefits.  To start, she is a candidate for one-to-one ratio THC CBD and then will follow up again in 3 months to reevaluate.\par \par Thank you for the courtesy of allowing me to participate in the evaluation and care of your patient.

## 2021-06-10 NOTE — HISTORY OF PRESENT ILLNESS
[FreeTextEntry1] : I had the pleasure of evaluating your patient in my office today.  As you know she is a 60-year-old female with a 30+ year history of chronic widespread pain.  When asked, the patient states that her pain is all over her body and there is not one specific site that is without pain.  Just pain is constant aching with episodes of worsening pain.    Patient had an MRI of the lumbar spine and 1 year ago had a epidural steroid injection with good relief of her low back and lower extremity pain.  Numerical analog scale for pain is 5/6 at best 10/10 at worst and on average 9/10.\par \par Comorbidities denies depression anxiety.  Notes difficulty with sleep pattern.\par Triggers include prolonged sitting lifting or bending climbing stairs weather changes.\par \par Functional status: Works full-time.  Denies alcohol or tobacco use.\par \par Medications tried include Cymbalta which caused significant GI disturbance to site despite benefit.  Lyrica "killed her" and was discontinued.  She is being followed by rheumatology and as per her note none of the p.o. meds have worked.\par \par Current pain medications include gabapentin 2400/day in divided doses.  Oxycodone 5 mg as needed.  Diclofenac 75 mg 2/day.\par \par Past medical history arthritis questionable type.\par \par Social surgical history status post right brain meningioma resection.  Knee replacement.

## 2021-06-30 ENCOUNTER — APPOINTMENT (OUTPATIENT)
Dept: PAIN MANAGEMENT | Facility: CLINIC | Age: 61
End: 2021-06-30
Payer: COMMERCIAL

## 2021-06-30 VITALS
WEIGHT: 136 LBS | HEART RATE: 82 BPM | HEIGHT: 60 IN | BODY MASS INDEX: 26.7 KG/M2 | DIASTOLIC BLOOD PRESSURE: 79 MMHG | SYSTOLIC BLOOD PRESSURE: 118 MMHG

## 2021-06-30 PROCEDURE — 99212 OFFICE O/P EST SF 10 MIN: CPT

## 2021-06-30 PROCEDURE — 99072 ADDL SUPL MATRL&STAF TM PHE: CPT

## 2021-06-30 NOTE — REASON FOR VISIT
November 25, 2020        Melissa Elaine  [address] [address]    To Whom It May Concern:    This is to certify Melissa Elaine was evaluated on 11/25/20 and is unable to return to work.    Melissa Elaine should self-isolate until covid-19 test result is negative  ?  CDC guidelines for return to work are as follows:  · At least 24 hours have passed since fever resolution without use of fever reducing medication and   · Symptoms have improved and  · At least 10 days have passed since symptoms first appeared    **The loss of taste and smell may persist for weeks or months after recovery and do not need to delay the end of isolation.     Per CDC recommendations, employers should not require a COVID-19 test result or a healthcare provider’s note for employees who are sick to validate their illness, qualify for sick leave, or to return to work.    The Coronavirus is a rapidly evolving situation and recommendations are changing regularly to prevent spread of the disease and further loss of life.    Thank you for your understanding during these unusual times.     Electronically signed by:     Yuli Macias, CNP                 [address] [facility] [id number]     [Follow-Up: _____] : a [unfilled] follow-up visit

## 2021-06-30 NOTE — PHYSICAL EXAM
[General Appearance - Alert] : alert [General Appearance - Well-Appearing] : healthy appearing [] : normal voice and communication [Affect] : the affect was normal [Oriented To Time, Place, And Person] : oriented to person, place, and time [Mood] : the mood was normal [Sclera] : the sclera and conjunctiva were normal [Abnormal Walk] : normal gait

## 2021-06-30 NOTE — HISTORY OF PRESENT ILLNESS
[FreeTextEntry1] : Pt is here today to be registered for Medical Marijuana as per Dr Miller. Registration process explained to pt . \par Pt has a hoarse voice today - had a recent vocal cord biopsy. \par \par UDS reviewed .

## 2021-07-14 ENCOUNTER — RX RENEWAL (OUTPATIENT)
Age: 61
End: 2021-07-14

## 2021-07-19 ENCOUNTER — APPOINTMENT (OUTPATIENT)
Dept: RHEUMATOLOGY | Facility: CLINIC | Age: 61
End: 2021-07-19

## 2021-07-26 ENCOUNTER — NON-APPOINTMENT (OUTPATIENT)
Age: 61
End: 2021-07-26

## 2021-07-26 ENCOUNTER — APPOINTMENT (OUTPATIENT)
Dept: CARDIOLOGY | Facility: CLINIC | Age: 61
End: 2021-07-26
Payer: COMMERCIAL

## 2021-07-26 VITALS — DIASTOLIC BLOOD PRESSURE: 72 MMHG | SYSTOLIC BLOOD PRESSURE: 128 MMHG

## 2021-07-26 VITALS
WEIGHT: 137 LBS | OXYGEN SATURATION: 96 % | DIASTOLIC BLOOD PRESSURE: 76 MMHG | SYSTOLIC BLOOD PRESSURE: 120 MMHG | HEART RATE: 73 BPM | BODY MASS INDEX: 26.76 KG/M2

## 2021-07-26 DIAGNOSIS — R68.89 OTHER GENERAL SYMPTOMS AND SIGNS: ICD-10-CM

## 2021-07-26 DIAGNOSIS — R03.0 ELEVATED BLOOD-PRESSURE READING, W/OUT DIAGNOSIS OF HYPERTENSION: ICD-10-CM

## 2021-07-26 PROCEDURE — 99072 ADDL SUPL MATRL&STAF TM PHE: CPT

## 2021-07-26 PROCEDURE — 93000 ELECTROCARDIOGRAM COMPLETE: CPT

## 2021-07-26 PROCEDURE — 99214 OFFICE O/P EST MOD 30 MIN: CPT

## 2021-07-26 NOTE — HISTORY OF PRESENT ILLNESS
[FreeTextEntry1] : She is status post resection of meningioma September 2020.\par She presents today to make sure that her cardiac status is okay, in light of her family history as well as a new symptom.  Specifically, she is now noting fatigue, requiring her to stop, associated with sweating, when she walks.  She has noticed this relatively recently, but states that she has been quite inactive previously.\par She denies chest pain, shortness of breath, palpitations, and dizziness.

## 2021-08-05 ENCOUNTER — APPOINTMENT (OUTPATIENT)
Dept: ORTHOPEDIC SURGERY | Facility: CLINIC | Age: 61
End: 2021-08-05

## 2021-08-06 ENCOUNTER — APPOINTMENT (OUTPATIENT)
Dept: NEUROLOGY | Facility: CLINIC | Age: 61
End: 2021-08-06
Payer: COMMERCIAL

## 2021-08-06 ENCOUNTER — APPOINTMENT (OUTPATIENT)
Dept: ORTHOPEDIC SURGERY | Facility: CLINIC | Age: 61
End: 2021-08-06

## 2021-08-06 VITALS
BODY MASS INDEX: 26.9 KG/M2 | HEART RATE: 64 BPM | HEIGHT: 60 IN | SYSTOLIC BLOOD PRESSURE: 113 MMHG | DIASTOLIC BLOOD PRESSURE: 74 MMHG | WEIGHT: 137 LBS

## 2021-08-06 PROCEDURE — 99214 OFFICE O/P EST MOD 30 MIN: CPT

## 2021-08-07 NOTE — ASSESSMENT
[FreeTextEntry1] : 60-year-old woman with history of meningioma resection 4-1/2 years ago and 2 episodes of falling out of bed followed by confusion is consistent with partial seizure disorder.  She currently is on gabapentin 1200 mg 3 times daily which may be inadequate in preventing seizure recurrence. She was urged to remember to take the gabapentin as prescribed.  We discussed seizure precautions including no driving.  She will obtain EEG and return to see me afterwards.  She was told I will be away on vacation for the next 2 weeks and she can contact my covering neurologists if any problems or questions arise.  If she has any recurrence of seizures she should be brought to [facility] ED and admitted to our epilepsy monitoring unit.

## 2021-08-07 NOTE — HISTORY OF PRESENT ILLNESS
[FreeTextEntry1] : 60-year-old woman was found to have an incidental right temporal lobe meningioma after a fall and brain imaging 4-1/2 years ago.  This meningioma was monitored for several years and when it started to show evidence of slow growth and surrounding edema it was resected 11 months ago at [facility].  At that time she was placed on Keppra which was discontinued after 2 months.  She also has a history of psoriatic arthritis and is on Cosentyx.  She sees a rheumatologist who feels patient has fibromyalgia and has prescribed gabapentin currently 1200 mg 3 times daily.  \par \par She was referred to my office after 2 episodes that occurred 10 days and 1 week ago where she fell out of bed onto a carpeted floor and was momentarily confused.  Since those 2 episodes she placed her mattress on the floor.  She also has been complaining of a feeling of retropulsion when she walks up a staircase.  And she also reports missing steps when she walks downstairs.  She also has been forgetful.

## 2021-08-07 NOTE — PHYSICAL EXAM
[FreeTextEntry1] : She is alert and oriented.  No aphasia.  Short-term recall is 1 of 3 words after 3 minutes.  She is able to spell and reverse spelling of a 5 letter word.  Visual fields and fundi are normal.  Pupils are equal and constrict to light.  Extraocular movements intact.  Remainder of cranial nerve testing is unremarkable.  Her neck is supple.  There is no focal weakness or sensory loss.  No pathologic reflexes. Her gait and coordination is  intact.

## 2021-08-12 ENCOUNTER — APPOINTMENT (OUTPATIENT)
Dept: NEUROLOGY | Facility: CLINIC | Age: 61
End: 2021-08-12
Payer: COMMERCIAL

## 2021-08-12 PROCEDURE — 95816 EEG AWAKE AND DROWSY: CPT

## 2021-08-17 ENCOUNTER — APPOINTMENT (OUTPATIENT)
Dept: PAIN MANAGEMENT | Facility: CLINIC | Age: 61
End: 2021-08-17
Payer: COMMERCIAL

## 2021-08-17 VITALS
HEIGHT: 60 IN | DIASTOLIC BLOOD PRESSURE: 72 MMHG | SYSTOLIC BLOOD PRESSURE: 112 MMHG | WEIGHT: 137 LBS | HEART RATE: 73 BPM | BODY MASS INDEX: 26.9 KG/M2

## 2021-08-17 PROCEDURE — 99213 OFFICE O/P EST LOW 20 MIN: CPT

## 2021-08-17 NOTE — HISTORY OF PRESENT ILLNESS
[FreeTextEntry1] : Reason for Visit : chronic diffuse pain\par \par SHAWNA NICK is a 61 year RH female with a Pmhx of diffuse chronic pain including lower back pain and bilateral knees who presents for follow up . She has tried medical marijuana for the last 3 weeks and without benefit and also developed severe constipation. She was prescribed Oxycodone remotely following surgery last year and is occasionally taking this for severe pain.   \par \par Current medications include: Diclofenac 75 mg BID, Gabapentin 3600mg/day \par \par \par

## 2021-08-17 NOTE — REVIEW OF SYSTEMS
[Constipation] : constipation [As Noted in HPI] : as noted in HPI [Joint Pain] : joint pain [Lower Back Pain] : lower back pain [Limb Pain] : limb pain [Negative] : Heme/Lymph

## 2021-08-17 NOTE — ASSESSMENT
[FreeTextEntry1] : 60 y/o F with chronic pain syndrome- chronic back pain and BL knee pain.  Recent EEG ordered by Dr. Mota which shows no epileptiform waves.  Medical marijuana 1:1 currently not helping with her pain. \par \par - Case to be d/w Dr. Mota\par - Consider increasing trial of different ration but will be cautious given possible increased risk of seizures. \par \par Patient seen and examined with Dr. Miller who agrees with A/p.\par

## 2021-08-19 ENCOUNTER — APPOINTMENT (OUTPATIENT)
Dept: CARDIOLOGY | Facility: CLINIC | Age: 61
End: 2021-08-19
Payer: COMMERCIAL

## 2021-08-19 PROCEDURE — 93306 TTE W/DOPPLER COMPLETE: CPT

## 2021-08-22 ENCOUNTER — NON-APPOINTMENT (OUTPATIENT)
Age: 61
End: 2021-08-22

## 2021-08-25 ENCOUNTER — NON-APPOINTMENT (OUTPATIENT)
Age: 61
End: 2021-08-25

## 2021-08-31 ENCOUNTER — APPOINTMENT (OUTPATIENT)
Dept: NEUROLOGY | Facility: CLINIC | Age: 61
End: 2021-08-31
Payer: COMMERCIAL

## 2021-08-31 VITALS
HEART RATE: 72 BPM | SYSTOLIC BLOOD PRESSURE: 111 MMHG | WEIGHT: 137 LBS | HEIGHT: 60 IN | BODY MASS INDEX: 26.9 KG/M2 | DIASTOLIC BLOOD PRESSURE: 73 MMHG

## 2021-08-31 VITALS — SYSTOLIC BLOOD PRESSURE: 115 MMHG | DIASTOLIC BLOOD PRESSURE: 75 MMHG | HEART RATE: 78 BPM

## 2021-08-31 PROCEDURE — 99213 OFFICE O/P EST LOW 20 MIN: CPT

## 2021-08-31 NOTE — HISTORY OF PRESENT ILLNESS
[FreeTextEntry1] : 61-year-old woman seen 3 weeks ago. Shewas found to have an incidental right temporal lobe meningioma after a fall and brain imaging 4-1/2 years ago.  This meningioma was monitored for several years and when it started to show evidence of slow growth and surrounding edema it was resected 11 months ago at [facility].  At that time she was placed on Keppra which was discontinued after 2 months.  She also has a history of psoriatic arthritis and is on Cosentyx.  She sees a rheumatologist who feels patient has fibromyalgia and has prescribed gabapentin currently 1200 mg 3 times daily.  \par \par She was referred to my office after 2 episodes that occurred last month where she fell out of bed onto a carpeted floor and was momentarily confused.  Since those 2 episodes she placed her mattress on the floor.  She also has been complaining of a feeling of retropulsion when she walks up a staircase.  And she also reports missing steps when she walks downstairs.  She also has been forgetful.\par \par She was sent for routine EEG which showed no epileptiform activity.  Slow activity was noted over the right temporal area (report in E HR).  She has had no further episodes of altered consciousness.  She complains of daily fatigue.

## 2021-08-31 NOTE — ASSESSMENT
[FreeTextEntry1] : She was reluctant to be admitted to our epilepsy monitoring unit.  For seizure prophylaxis she will start oxcarbazepine 300 mg every 12 hours and reduce the dosage of gabapentin to 600 mg ever 8 hours.  Hopefully the reduction in gabapentin dosage will make her less fatigued during the day.  She will contact me if she notes any adverse effects from these changes.  If not she will return for follow-up in 3 months.

## 2021-09-03 ENCOUNTER — NON-APPOINTMENT (OUTPATIENT)
Age: 61
End: 2021-09-03

## 2021-09-05 ENCOUNTER — RX RENEWAL (OUTPATIENT)
Age: 61
End: 2021-09-05

## 2021-09-13 ENCOUNTER — APPOINTMENT (OUTPATIENT)
Dept: RHEUMATOLOGY | Facility: CLINIC | Age: 61
End: 2021-09-13
Payer: COMMERCIAL

## 2021-09-13 VITALS
DIASTOLIC BLOOD PRESSURE: 75 MMHG | HEIGHT: 60 IN | WEIGHT: 135 LBS | TEMPERATURE: 97.2 F | OXYGEN SATURATION: 98 % | SYSTOLIC BLOOD PRESSURE: 114 MMHG | HEART RATE: 78 BPM | RESPIRATION RATE: 18 BRPM | BODY MASS INDEX: 26.5 KG/M2

## 2021-09-13 DIAGNOSIS — M25.552 PAIN IN LEFT HIP: ICD-10-CM

## 2021-09-13 PROCEDURE — 99214 OFFICE O/P EST MOD 30 MIN: CPT

## 2021-09-24 ENCOUNTER — RX RENEWAL (OUTPATIENT)
Age: 61
End: 2021-09-24

## 2021-10-14 ENCOUNTER — APPOINTMENT (OUTPATIENT)
Dept: PAIN MANAGEMENT | Facility: CLINIC | Age: 61
End: 2021-10-14
Payer: COMMERCIAL

## 2021-10-14 VITALS
SYSTOLIC BLOOD PRESSURE: 114 MMHG | BODY MASS INDEX: 26.9 KG/M2 | HEART RATE: 82 BPM | WEIGHT: 137 LBS | DIASTOLIC BLOOD PRESSURE: 74 MMHG | HEIGHT: 60 IN

## 2021-10-14 PROCEDURE — 99214 OFFICE O/P EST MOD 30 MIN: CPT

## 2021-10-14 NOTE — HISTORY OF PRESENT ILLNESS
[FreeTextEntry1] : Patient continues to co multiple joint pain and bilateral feet pain. She states she was told she does not have seizures by Dr. Mota. However, according to Dr. Mota's notes... he continues to be concerned about seizure activity. Patient refuses to take any anticonvulsants. . \par \par

## 2021-10-14 NOTE — ASSESSMENT
[FreeTextEntry1] : 60 y/o F with chronic pain syndrome- chronic back pain and BL knee pain and bilateral foot pain.  Recent EEG ordered by Dr. Mota which shows no epileptiform waves.  Medical marijuana 1:1 currently not helping with her pain. \par \par Patient feels she was told by Dr. Mota she does not have seizures. Will sw Dr. Mota to confirm\par Will not add any more THC to her regime.\par Will consider low dose Pamelor 10 mgs qhs for pain control\par  \par Advised patient not to drive or swim .until further notice. \par \par

## 2021-10-15 ENCOUNTER — RX RENEWAL (OUTPATIENT)
Age: 61
End: 2021-10-15

## 2021-10-18 ENCOUNTER — APPOINTMENT (OUTPATIENT)
Dept: ORTHOPEDIC SURGERY | Facility: CLINIC | Age: 61
End: 2021-10-18

## 2021-10-18 ENCOUNTER — APPOINTMENT (OUTPATIENT)
Dept: CARDIOLOGY | Facility: CLINIC | Age: 61
End: 2021-10-18

## 2021-10-20 ENCOUNTER — NON-APPOINTMENT (OUTPATIENT)
Age: 61
End: 2021-10-20

## 2021-10-25 ENCOUNTER — APPOINTMENT (OUTPATIENT)
Dept: MAMMOGRAPHY | Facility: CLINIC | Age: 61
End: 2021-10-25
Payer: COMMERCIAL

## 2021-10-25 ENCOUNTER — APPOINTMENT (OUTPATIENT)
Dept: RHEUMATOLOGY | Facility: CLINIC | Age: 61
End: 2021-10-25
Payer: COMMERCIAL

## 2021-10-25 ENCOUNTER — APPOINTMENT (OUTPATIENT)
Dept: ULTRASOUND IMAGING | Facility: CLINIC | Age: 61
End: 2021-10-25
Payer: COMMERCIAL

## 2021-10-25 VITALS
WEIGHT: 134 LBS | SYSTOLIC BLOOD PRESSURE: 117 MMHG | TEMPERATURE: 97.2 F | HEIGHT: 60 IN | HEART RATE: 77 BPM | BODY MASS INDEX: 26.31 KG/M2 | OXYGEN SATURATION: 97 % | DIASTOLIC BLOOD PRESSURE: 70 MMHG

## 2021-10-25 DIAGNOSIS — Z23 ENCOUNTER FOR IMMUNIZATION: ICD-10-CM

## 2021-10-25 PROCEDURE — 76641 ULTRASOUND BREAST COMPLETE: CPT | Mod: 50

## 2021-10-25 PROCEDURE — 99215 OFFICE O/P EST HI 40 MIN: CPT | Mod: 25

## 2021-10-25 PROCEDURE — 90686 IIV4 VACC NO PRSV 0.5 ML IM: CPT

## 2021-10-25 PROCEDURE — G0008: CPT

## 2021-10-25 PROCEDURE — 77067 SCR MAMMO BI INCL CAD: CPT

## 2021-10-25 PROCEDURE — 77063 BREAST TOMOSYNTHESIS BI: CPT

## 2021-10-26 LAB
25(OH)D3 SERPL-MCNC: 56.9 NG/ML
ALBUMIN SERPL ELPH-MCNC: 4.5 G/DL
ALP BLD-CCNC: 59 U/L
ALT SERPL-CCNC: 14 U/L
ANION GAP SERPL CALC-SCNC: 14 MMOL/L
AST SERPL-CCNC: 20 U/L
BASOPHILS # BLD AUTO: 0.03 K/UL
BASOPHILS NFR BLD AUTO: 0.5 %
BILIRUB SERPL-MCNC: 0.2 MG/DL
BUN SERPL-MCNC: 17 MG/DL
CALCIUM SERPL-MCNC: 9.6 MG/DL
CHLORIDE SERPL-SCNC: 104 MMOL/L
CO2 SERPL-SCNC: 22 MMOL/L
COVID-19 SPIKE DOMAIN ANTIBODY INTERPRETATION: POSITIVE
CREAT SERPL-MCNC: 0.8 MG/DL
CRP SERPL-MCNC: <3 MG/L
EOSINOPHIL # BLD AUTO: 0.08 K/UL
EOSINOPHIL NFR BLD AUTO: 1.3 %
ERYTHROCYTE [SEDIMENTATION RATE] IN BLOOD BY WESTERGREN METHOD: 3 MM/HR
GLUCOSE SERPL-MCNC: 89 MG/DL
HCT VFR BLD CALC: 41.9 %
HGB BLD-MCNC: 14 G/DL
IMM GRANULOCYTES NFR BLD AUTO: 0.2 %
LYMPHOCYTES # BLD AUTO: 2.75 K/UL
LYMPHOCYTES NFR BLD AUTO: 44.5 %
MAN DIFF?: NORMAL
MCHC RBC-ENTMCNC: 31.5 PG
MCHC RBC-ENTMCNC: 33.4 GM/DL
MCV RBC AUTO: 94.2 FL
MONOCYTES # BLD AUTO: 0.43 K/UL
MONOCYTES NFR BLD AUTO: 7 %
NEUTROPHILS # BLD AUTO: 2.88 K/UL
NEUTROPHILS NFR BLD AUTO: 46.5 %
PLATELET # BLD AUTO: 220 K/UL
POTASSIUM SERPL-SCNC: 4.6 MMOL/L
PROT SERPL-MCNC: 6.8 G/DL
RBC # BLD: 4.45 M/UL
RBC # FLD: 14 %
SARS-COV-2 AB SERPL IA-ACNC: >250 U/ML
SODIUM SERPL-SCNC: 139 MMOL/L
WBC # FLD AUTO: 6.18 K/UL

## 2021-10-26 RX ORDER — METHOTREXATE 15 MG/.3ML
15 INJECTION, SOLUTION SUBCUTANEOUS
Qty: 4 | Refills: 2 | Status: DISCONTINUED | COMMUNITY
Start: 2021-03-08 | End: 2021-10-26

## 2021-10-28 ENCOUNTER — APPOINTMENT (OUTPATIENT)
Dept: ORTHOPEDIC SURGERY | Facility: CLINIC | Age: 61
End: 2021-10-28
Payer: COMMERCIAL

## 2021-10-28 VITALS
WEIGHT: 134 LBS | BODY MASS INDEX: 26.31 KG/M2 | SYSTOLIC BLOOD PRESSURE: 103 MMHG | HEIGHT: 60 IN | HEART RATE: 73 BPM | DIASTOLIC BLOOD PRESSURE: 70 MMHG

## 2021-10-28 DIAGNOSIS — M65.311 TRIGGER THUMB, RIGHT THUMB: ICD-10-CM

## 2021-10-28 PROCEDURE — 99212 OFFICE O/P EST SF 10 MIN: CPT | Mod: 25

## 2021-10-28 PROCEDURE — 20550 NJX 1 TENDON SHEATH/LIGAMENT: CPT | Mod: 50

## 2021-10-28 NOTE — HISTORY OF PRESENT ILLNESS
[de-identified] : Patient is here for bilateral hand pain that began about 2 months ago without inciting injury. She has noticed locking of her first digit, L>R. She has tried self massage to treat it. She describes it as a sharp pain.

## 2021-10-28 NOTE — PROCEDURE
[de-identified] : Injection: Right 1st digit.\par Indication: Trigger finger.\par \par A discussion was had with the patient regarding this procedure and all questions were answered. All risks, benefits and alternatives were discussed. These included but were not limited to bleeding, infection, and allergic reaction. A timeout was performed prior to the procedure to ensure proper side and location.  Alcohol was used to clean and sterilize the skin over the volar aspect of the MCP. A 25-gauge needle was used to inject 0.5cc of 0.25% bupivacaine and 1cc of 6mg/mL betamethasone into the flexor tendon sheath. A sterile bandage was then applied. The patient tolerated the procedure well and there were no complications.\par _______________\par Injection: Left 1st digit.\par Indication: Trigger finger.\par \par A discussion was had with the patient regarding this procedure and all questions were answered. All risks, benefits and alternatives were discussed. These included but were not limited to bleeding, infection, and allergic reaction. A timeout was performed prior to the procedure to ensure proper side and location.  Alcohol was used to clean and sterilize the skin over the volar aspect of the MCP. A 25-gauge needle was used to inject 0.5cc of 0.25% bupivacaine and 1cc of 6mg/mL betamethasone into the flexor tendon sheath. A sterile bandage was then applied. The patient tolerated the procedure well and there were no complications.\par

## 2021-10-28 NOTE — PHYSICAL EXAM
[de-identified] : Constitutional: Well-nourished, well-developed, No acute distress\par Respiratory:  Good respiratory effort, no SOB\par Psychiatric: Pleasant and normal affect, alert and oriented x3\par Musculoskeletal: normal except where as noted in regional exam\par \par Bilateral Hands:\par APPEARANCE: no marked deformities, no swelling or malalignment\par POSITIVE TENDERNESS: mild along flexor tendon and sheath superficial to the MCP at the 1st digit\par NONTENDER: osseous and ligamentous structures. \par ROM: full & painless although + triggering of the involved finger going from full flexion into extension. \par RESISTIVE TESTING: painless resisted testing. \par SPECIAL TESTS: normal sensation of 1st through 5th digits, normal flex/ext, abd/add, and opposition of thumb, no triggering of other fingers\par Vasc: 2+ radial pulse, normal capillary refill\par Neuro: AIN, PIN, Ulnar nerve intact to motor\par Sensation: Intact to light touch throughout\par \par

## 2021-10-28 NOTE — DISCUSSION/SUMMARY
[de-identified] : Discussed findings of today's exam and possible causes of patient's pain.  Educated patient on their most probable diagnosis of bilateral 1st digit trigger fingers.  Reviewed possible courses of treatment, and we collaboratively decided best course of treatment at this time will include bilateral cortisone injections today (see procedure note).  Informed the patient that the numbing medicine in today's injection will last for about 4-6 hours. The steroid that was injected will start to work in 1 to 2 days, peak at 1-2 weeks, and may last up to 4-6 weeks. Discussed with the patient the expected course of trigger fingers, and the variable response to cortisone injections. Some are relieved with a single injection, while others require repeat injections.  Based on the patient's symptoms today, no hand therapy will likely be needed.  We will wait and see how patient responds to this one cortisone injection, may consider repeat injection in 6 weeks if issue is improved but not fully resolved. Patient may consider consultation with hand/wrist surgeon to have a discussion about repeat cortisone injections, versus possible surgical intervention.  Patient appreciates and agrees with current plan.\par \par \par This note was generated using dragon medical dictation software.  A reasonable effort has been made for proofreading its contents, but typos may still remain.  If there are any questions or points of clarification needed please notify my office.\par \par

## 2021-11-01 ENCOUNTER — NON-APPOINTMENT (OUTPATIENT)
Age: 61
End: 2021-11-01

## 2021-11-19 ENCOUNTER — RX RENEWAL (OUTPATIENT)
Age: 61
End: 2021-11-19

## 2021-12-07 ENCOUNTER — APPOINTMENT (OUTPATIENT)
Dept: CARDIOLOGY | Facility: CLINIC | Age: 61
End: 2021-12-07
Payer: COMMERCIAL

## 2021-12-07 PROCEDURE — 93015 CV STRESS TEST SUPVJ I&R: CPT

## 2021-12-13 ENCOUNTER — RX RENEWAL (OUTPATIENT)
Age: 61
End: 2021-12-13

## 2021-12-16 ENCOUNTER — NON-APPOINTMENT (OUTPATIENT)
Age: 61
End: 2021-12-16

## 2021-12-17 ENCOUNTER — NON-APPOINTMENT (OUTPATIENT)
Age: 61
End: 2021-12-17

## 2022-01-06 ENCOUNTER — APPOINTMENT (OUTPATIENT)
Dept: RHEUMATOLOGY | Facility: CLINIC | Age: 62
End: 2022-01-06

## 2022-01-06 ENCOUNTER — APPOINTMENT (OUTPATIENT)
Dept: ORTHOPEDIC SURGERY | Facility: CLINIC | Age: 62
End: 2022-01-06

## 2022-01-13 ENCOUNTER — APPOINTMENT (OUTPATIENT)
Dept: OTOLARYNGOLOGY | Facility: CLINIC | Age: 62
End: 2022-01-13

## 2022-02-09 ENCOUNTER — APPOINTMENT (OUTPATIENT)
Dept: RHEUMATOLOGY | Facility: CLINIC | Age: 62
End: 2022-02-09
Payer: COMMERCIAL

## 2022-02-09 VITALS
HEIGHT: 60 IN | TEMPERATURE: 97.8 F | HEART RATE: 75 BPM | BODY MASS INDEX: 26.55 KG/M2 | DIASTOLIC BLOOD PRESSURE: 81 MMHG | SYSTOLIC BLOOD PRESSURE: 125 MMHG | OXYGEN SATURATION: 97 % | WEIGHT: 135.2 LBS

## 2022-02-09 DIAGNOSIS — Z15.89 GENETIC SUSCEPTIBILITY TO OTHER DISEASE: ICD-10-CM

## 2022-02-09 PROCEDURE — 99215 OFFICE O/P EST HI 40 MIN: CPT

## 2022-02-10 LAB
ALBUMIN SERPL ELPH-MCNC: 4.6 G/DL
ALP BLD-CCNC: 64 U/L
ALT SERPL-CCNC: 17 U/L
ANION GAP SERPL CALC-SCNC: 11 MMOL/L
APPEARANCE: CLEAR
AST SERPL-CCNC: 22 U/L
BASOPHILS # BLD AUTO: 0.02 K/UL
BASOPHILS NFR BLD AUTO: 0.3 %
BILIRUB SERPL-MCNC: 0.3 MG/DL
BILIRUBIN URINE: NEGATIVE
BLOOD URINE: NEGATIVE
BUN SERPL-MCNC: 14 MG/DL
CALCIUM SERPL-MCNC: 9.8 MG/DL
CHLORIDE SERPL-SCNC: 104 MMOL/L
CO2 SERPL-SCNC: 25 MMOL/L
COLOR: YELLOW
CREAT SERPL-MCNC: 0.74 MG/DL
CREAT SPEC-SCNC: 53 MG/DL
CREAT/PROT UR: 0.1 RATIO
CRP SERPL-MCNC: <3 MG/L
EOSINOPHIL # BLD AUTO: 0.03 K/UL
EOSINOPHIL NFR BLD AUTO: 0.5 %
ERYTHROCYTE [SEDIMENTATION RATE] IN BLOOD BY WESTERGREN METHOD: 2 MM/HR
GLUCOSE QUALITATIVE U: NEGATIVE
GLUCOSE SERPL-MCNC: 89 MG/DL
HBV CORE IGG+IGM SER QL: NONREACTIVE
HBV SURFACE AB SER QL: NONREACTIVE
HBV SURFACE AG SER QL: NONREACTIVE
HCT VFR BLD CALC: 38.4 %
HCV AB SER QL: NONREACTIVE
HCV S/CO RATIO: 0.19 S/CO
HGB BLD-MCNC: 13 G/DL
IMM GRANULOCYTES NFR BLD AUTO: 0.3 %
KETONES URINE: NEGATIVE
LEUKOCYTE ESTERASE URINE: NEGATIVE
LYMPHOCYTES # BLD AUTO: 2.67 K/UL
LYMPHOCYTES NFR BLD AUTO: 44.2 %
MAN DIFF?: NORMAL
MCHC RBC-ENTMCNC: 30.4 PG
MCHC RBC-ENTMCNC: 33.9 GM/DL
MCV RBC AUTO: 89.7 FL
MONOCYTES # BLD AUTO: 0.39 K/UL
MONOCYTES NFR BLD AUTO: 6.5 %
NEUTROPHILS # BLD AUTO: 2.91 K/UL
NEUTROPHILS NFR BLD AUTO: 48.2 %
NITRITE URINE: NEGATIVE
PH URINE: 7
PLATELET # BLD AUTO: 202 K/UL
POTASSIUM SERPL-SCNC: 3.7 MMOL/L
PROT SERPL-MCNC: 6.8 G/DL
PROT UR-MCNC: 5 MG/DL
PROTEIN URINE: NEGATIVE
RBC # BLD: 4.28 M/UL
RBC # FLD: 12.9 %
SODIUM SERPL-SCNC: 140 MMOL/L
SPECIFIC GRAVITY URINE: 1.01
UROBILINOGEN URINE: NORMAL
WBC # FLD AUTO: 6.04 K/UL

## 2022-02-11 ENCOUNTER — APPOINTMENT (OUTPATIENT)
Dept: ULTRASOUND IMAGING | Facility: CLINIC | Age: 62
End: 2022-02-11

## 2022-02-16 ENCOUNTER — APPOINTMENT (OUTPATIENT)
Dept: RADIOLOGY | Facility: CLINIC | Age: 62
End: 2022-02-16
Payer: COMMERCIAL

## 2022-02-16 ENCOUNTER — APPOINTMENT (OUTPATIENT)
Dept: ULTRASOUND IMAGING | Facility: CLINIC | Age: 62
End: 2022-02-16

## 2022-02-16 ENCOUNTER — OUTPATIENT (OUTPATIENT)
Dept: OUTPATIENT SERVICES | Facility: HOSPITAL | Age: 62
LOS: 1 days | End: 2022-02-16
Payer: COMMERCIAL

## 2022-02-16 DIAGNOSIS — G89.4 CHRONIC PAIN SYNDROME: ICD-10-CM

## 2022-02-16 DIAGNOSIS — K46.9 UNSPECIFIED ABDOMINAL HERNIA WITHOUT OBSTRUCTION OR GANGRENE: Chronic | ICD-10-CM

## 2022-02-16 PROCEDURE — 73522 X-RAY EXAM HIPS BI 3-4 VIEWS: CPT

## 2022-02-16 PROCEDURE — 73522 X-RAY EXAM HIPS BI 3-4 VIEWS: CPT | Mod: 26

## 2022-02-16 PROCEDURE — 76830 TRANSVAGINAL US NON-OB: CPT | Mod: 26

## 2022-02-16 PROCEDURE — 76830 TRANSVAGINAL US NON-OB: CPT

## 2022-02-16 PROCEDURE — 76856 US EXAM PELVIC COMPLETE: CPT | Mod: 26

## 2022-02-16 PROCEDURE — 76856 US EXAM PELVIC COMPLETE: CPT

## 2022-02-17 LAB
M TB IFN-G BLD-IMP: NEGATIVE
QUANTIFERON TB PLUS MITOGEN MINUS NIL: 9.95 IU/ML
QUANTIFERON TB PLUS NIL: 0.05 IU/ML
QUANTIFERON TB PLUS TB1 MINUS NIL: -0.01 IU/ML
QUANTIFERON TB PLUS TB2 MINUS NIL: 0 IU/ML

## 2022-02-22 ENCOUNTER — NON-APPOINTMENT (OUTPATIENT)
Age: 62
End: 2022-02-22

## 2022-02-22 DIAGNOSIS — M47.819 SPONDYLOSIS W/OUT MYELOPATHY OR RADICULOPATHY, SITE UNSPECIFIED: ICD-10-CM

## 2022-02-24 ENCOUNTER — APPOINTMENT (OUTPATIENT)
Dept: ORTHOPEDIC SURGERY | Facility: CLINIC | Age: 62
End: 2022-02-24
Payer: COMMERCIAL

## 2022-02-24 VITALS
WEIGHT: 135 LBS | SYSTOLIC BLOOD PRESSURE: 115 MMHG | BODY MASS INDEX: 26.5 KG/M2 | HEIGHT: 60 IN | HEART RATE: 69 BPM | DIASTOLIC BLOOD PRESSURE: 78 MMHG

## 2022-02-24 DIAGNOSIS — M65.312 TRIGGER THUMB, LEFT THUMB: ICD-10-CM

## 2022-02-24 PROCEDURE — 20550 NJX 1 TENDON SHEATH/LIGAMENT: CPT | Mod: FA

## 2022-02-24 PROCEDURE — 99214 OFFICE O/P EST MOD 30 MIN: CPT | Mod: 25

## 2022-02-24 NOTE — HISTORY OF PRESENT ILLNESS
[de-identified] : Patient is here for left hand pain follow up. She had relief from the triggering with the injection but it has returned without injury. She has done nothing to treat it.

## 2022-02-24 NOTE — DISCUSSION/SUMMARY
[de-identified] : Discussed findings of today's exam and possible causes of patient's pain.  Educated patient on their most probable diagnosis of left first digit trigger finger.  This is been a chronic issue for the patient with recent atraumatic exacerbation.  Reviewed possible courses of treatment, and we collaboratively decided best course of treatment at this time will include cortisone injection today (see procedure note).  Informed the patient that the numbing medicine in today's injection will last for about 4-6 hours. The steroid that was injected will start to work in 1 to 2 days, peak at 1-2 weeks, and may last up to 4-6 weeks. Discussed with the patient the expected course of trigger fingers, and the variable response to cortisone injections. Some are relieved with a single injection, while others require repeat injections.  Patient consented to repeat cortisone injection to the involved trigger fingers today.  We will wait and see how patient responds to this cortisone injection. Advised patient if the problem persists I would recommend consultation with hand/wrist surgeon to have a discussion about surgical intervention as further repeat injections are unlikely to be of much benefit.  Patient appreciates and agrees with current plan.\par \par I work as part of an academic orthopedic group and routinely have a physician in training (resident / fellow) working with me.  Any part of the history and physical exam performed by the physician in training was either directly reviewed and/or replicated by myself.  Any procedure performed by the physician in training was performed under my direct supervision and with the consent of the patient.\par \par This note was generated using dragon medical dictation software.  A reasonable effort has been made for proofreading its contents, but typos may still remain.  If there are any questions or points of clarification needed please notify my office.

## 2022-02-24 NOTE — PHYSICAL EXAM
[de-identified] : Constitutional: Well-nourished, well-developed, No acute distress\par Respiratory:  Good respiratory effort, no SOB\par Psychiatric: Pleasant and normal affect, alert and oriented x3\par Musculoskeletal: normal except where as noted in regional exam\par \par Left Hand:\par APPEARANCE: no marked deformities, no swelling or malalignment\par POSITIVE TENDERNESS: mild along flexor tendon and sheath superficial to the MCP at the 1stdigit\par NONTENDER: osseous and ligamentous structures. \par ROM: full & painless although + triggering of the involved finger going from full flexion into extension. \par RESISTIVE TESTING: painless resisted testing. \par SPECIAL TESTS: normal sensation of 1st through 5th digits, normal flex/ext, abd/add, and opposition of thumb, no triggering of other fingers\par Vasc: 2+ radial pulse, normal capillary refill\par Neuro: AIN, PIN, Ulnar nerve intact to motor\par Sensation: Intact to light touch throughout\par \par

## 2022-02-24 NOTE — PROCEDURE
[de-identified] : Injection: Left 1st digit.\par Indication: Trigger finger.\par \par A discussion was had with the patient regarding this procedure and all questions were answered. All risks, benefits and alternatives were discussed. These included but were not limited to bleeding, infection, and allergic reaction. A timeout was done to ensure correct side and patient agreed to the procedure.  A [place] demario was created on the skin utilizing a plastic needle cap to demario the anticipated point of entry. Alcohol was used to clean the skin, and Betadine was used to sterilize and prep the area over the volar aspect of the MCP. A 25-gauge needle was used to inject 0.5cc of 0.25% bupivacaine and 1cc of 6mg/mL betamethasone into the flexor tendon sheath. A sterile bandage was then applied. The patient tolerated the procedure well and there were no complications.\par \par \par

## 2022-02-25 ENCOUNTER — RX RENEWAL (OUTPATIENT)
Age: 62
End: 2022-02-25

## 2022-03-06 ENCOUNTER — OUTPATIENT (OUTPATIENT)
Dept: OUTPATIENT SERVICES | Facility: HOSPITAL | Age: 62
LOS: 1 days | End: 2022-03-06
Payer: COMMERCIAL

## 2022-03-06 ENCOUNTER — APPOINTMENT (OUTPATIENT)
Dept: MRI IMAGING | Facility: CLINIC | Age: 62
End: 2022-03-06
Payer: COMMERCIAL

## 2022-03-06 DIAGNOSIS — K46.9 UNSPECIFIED ABDOMINAL HERNIA WITHOUT OBSTRUCTION OR GANGRENE: Chronic | ICD-10-CM

## 2022-03-06 DIAGNOSIS — M47.819 SPONDYLOSIS WITHOUT MYELOPATHY OR RADICULOPATHY, SITE UNSPECIFIED: ICD-10-CM

## 2022-03-06 PROCEDURE — 72195 MRI PELVIS W/O DYE: CPT | Mod: 26

## 2022-03-06 PROCEDURE — 73721 MRI JNT OF LWR EXTRE W/O DYE: CPT | Mod: 26,LT

## 2022-03-06 PROCEDURE — 72195 MRI PELVIS W/O DYE: CPT

## 2022-03-06 PROCEDURE — 73721 MRI JNT OF LWR EXTRE W/O DYE: CPT

## 2022-03-07 ENCOUNTER — APPOINTMENT (OUTPATIENT)
Dept: CARDIOLOGY | Facility: CLINIC | Age: 62
End: 2022-03-07

## 2022-03-29 ENCOUNTER — RX RENEWAL (OUTPATIENT)
Age: 62
End: 2022-03-29

## 2022-03-29 ENCOUNTER — APPOINTMENT (OUTPATIENT)
Dept: ORTHOPEDIC SURGERY | Facility: CLINIC | Age: 62
End: 2022-03-29
Payer: COMMERCIAL

## 2022-03-29 DIAGNOSIS — M25.552 PAIN IN LEFT HIP: ICD-10-CM

## 2022-03-29 PROCEDURE — 73564 X-RAY EXAM KNEE 4 OR MORE: CPT | Mod: RT

## 2022-03-29 PROCEDURE — 73502 X-RAY EXAM HIP UNI 2-3 VIEWS: CPT | Mod: LT

## 2022-03-29 PROCEDURE — 99215 OFFICE O/P EST HI 40 MIN: CPT

## 2022-03-29 NOTE — REASON FOR VISIT
[Follow-Up Visit] : a follow-up visit for [Hip Pain] : hip pain [Knee Pain] : knee pain [Artificial Knee Joint] : artificial knee joint [Radiculopathy] : radiculopathy

## 2022-03-29 NOTE — HISTORY OF PRESENT ILLNESS
[de-identified] : This is a 61-year-old female who returns for reevaluation of right knee pain and left buttock.  She was previously seen in 2019, previously diagnosed with mild to moderate osteoarthritis in the patellofemoral joint of the left knee but then underwent a right total knee arthroplasty by another surgeon, Dr. Ruvalcaba at the [facility] for [facility] surgery 2019.  She was never happy with her recovery.  She states that she initially healed well but she has not recovered in terms of her pain since the surgery.  The pain that she experiences now is a bit different as the pain is all over the right knee instead of just in the patellofemoral joint as it was before surgery.  She is even tried a right total knee arthroplasty intra-articular cortisone injection which did not help to improve the pain at all. No fevers or chills.  She says that she is still ambulating with a cane. She feels like she is recovering slowly and not making any further progress.  She does have a history of being on chronic pain medications and is currently taking tramadol and very high doses of gabapentin as well as NSAIDs.  She also sees pain management who told her that she needs to see a psychiatrist for "pain medication seeking behavior"according to the patient.  She also states that she has a history of right lower extremity radiculopathy and has multiple spine injections in the past which do not help.  She has a history of fibromyalgia, spondyloarthropathy and psoriatic arthritis being managed by her rheumatologist.  She tells me that when she was on DMARDs in the past this did help to improve all the pains that she has.  She has been working physical therapy.  She denies any fevers or chills.  Her knee is not giving way.  She is taking the followin mg gabapentin \par 400 mg Celebrex,  CBD oild for pain, Diclofenac gel, Tramadol.  She does not use any brace.  Recently she was told by this doctor that she has some scar tissue that is built up around the knee and she needs to have an arthroscopic debridement of this.  She also has left buttock pain.  No groin pain.\par She is under management of Dr Garcia (Rheumatology).  \par She also has a new complaint of buttock pain.  No recent traumas.  The pain does not radiate and is mainly located over the coccyx and sacrum area.\par Overall she has reduced walking tolerance and impaired activities of daily living.

## 2022-03-29 NOTE — PHYSICAL EXAM
[de-identified] : Patient is well nourished, well-developed, in no acute distress, with appropriate mood and affect. The patient is oriented to time, place, and person. Respirations are even and unlabored. Gait evaluation does reveal a limp. There is no inguinal adenopathy. Bilateral limbs are well-perfused, without skin lesions, shows a grossly normal motor and sensory examination. The right limb is well-perfused and showed 2+ dp/pt pulses, without skin lesions, shows a grossly normal motor and sensory examination. Examination of the hip shows no skin lesions. Hip motion is full and painless from 0-90 degrees extension to flexion, 20 degrees adduction and 20 degrees abduction, and 15 degrees internal and 30 degrees external rotation. FADIR is negative and BEBA is negative. Stinchfield test is negative. The left limb is well-perfused and showed 2+ dp/pt pulses, without skin lesions, shows a grossly normal motor and sensory examination. Examination of the hip shows no skin lesions. Hip motion is full and painless from 0-90 degrees extension to flexion, 20 degrees adduction and 20 degrees abduction, and 15 degrees internal and 30 degrees external rotation. FADIR is negative and BEBA is negative. Stinchfield test is negative.  Tender to palpation over the left buttock and gluteal region.  Leg lengths are approximately equal. Both hips are stable and muscle strength is normal with good strength with resisted abduction and adduction. Pedal pulses are palpable.  Tender to palpation over the coccyx and sacral areas as well as the sacroiliac joints.  Tender to palpation in nonanatomic distribution throughout the buttocks. The right knee motion is not reduced but is mildly painful and the right knee moves from 0-125 degrees. The knee is stable within that range-of-motion to AP and ML stress. The alignment of the knee is neutral.  Muscle strength is normal.  Positive patellar grind.  She is a well-healed midline surgical scar.  No effusion.  [de-identified] : AP, lateral, sunrise knee x-rays of the right knee were ordered and obtained in the office and demonstrate satisfactory position and alignment of the components are present. No signs of loosening are seen.\par \par AP and lateral x-rays of the left hip, pelvis, and femur were ordered and taken in the office and demonstrate no evidence of degenerative joint disease of the hip with maintained joint space and no evidence of fractures or other intraarticular pathology.\par \par The patient brings with her an MRI report of the right knee.  I reviewed the MRI report with the patient was demonstrates synovial thickening around the patella.

## 2022-03-29 NOTE — DISCUSSION/SUMMARY
[de-identified] : This patient is status post right total knee arthroplasty with chronic pain.  She does have some synovial thickening around the patella which could represent patellar clunk.  It is also possible she has a loosening of the patellar component.  She states that she is already had a negative infection work-up but does not have the paperwork provided today to show me this.  She has other sources of pain including fibromyalgia.  Her left hip is normal and it pain is extra-articular.  This is likely secondary to her known spondyloarthropathy and psoriatic arthritis and fibromyalgia.  I explained to her that the only way to know if the kneecap is loosening is to perform a bone scan at this point.  I ordered a bone scan for her but she is not sure she is going to do this.  She is already been indicated for surgery with Dr. Rothman and so recommend she follow-up with him again for this.  I explained to her that I do not do arthroscopic debridement of synovial thickening and if she wants any type of surgery would need to be an open procedure.  Several questions sought and answered.

## 2022-03-30 ENCOUNTER — APPOINTMENT (OUTPATIENT)
Dept: CARDIOLOGY | Facility: CLINIC | Age: 62
End: 2022-03-30

## 2022-03-31 ENCOUNTER — APPOINTMENT (OUTPATIENT)
Dept: ORTHOPEDIC SURGERY | Facility: CLINIC | Age: 62
End: 2022-03-31
Payer: COMMERCIAL

## 2022-03-31 ENCOUNTER — RX RENEWAL (OUTPATIENT)
Age: 62
End: 2022-03-31

## 2022-03-31 PROCEDURE — 99214 OFFICE O/P EST MOD 30 MIN: CPT

## 2022-03-31 PROCEDURE — 72100 X-RAY EXAM L-S SPINE 2/3 VWS: CPT

## 2022-04-06 ENCOUNTER — APPOINTMENT (OUTPATIENT)
Dept: ENDOCRINOLOGY | Facility: CLINIC | Age: 62
End: 2022-04-06
Payer: COMMERCIAL

## 2022-04-06 VITALS
RESPIRATION RATE: 17 BRPM | DIASTOLIC BLOOD PRESSURE: 78 MMHG | HEIGHT: 60.5 IN | WEIGHT: 134 LBS | TEMPERATURE: 98.1 F | OXYGEN SATURATION: 98 % | SYSTOLIC BLOOD PRESSURE: 126 MMHG | HEART RATE: 71 BPM | BODY MASS INDEX: 25.63 KG/M2

## 2022-04-06 PROCEDURE — ZZZZZ: CPT

## 2022-04-06 PROCEDURE — 77080 DXA BONE DENSITY AXIAL: CPT

## 2022-04-06 PROCEDURE — 99214 OFFICE O/P EST MOD 30 MIN: CPT | Mod: 25

## 2022-04-06 NOTE — PROCEDURE
[FreeTextEntry1] : Bone mineral density: 04/06/2022 \par Indication: vs. 2020\par Spine: (L1-L3) -2.5 osteoporosis, -5.8%\par Total hip: -2.3 osteopenia, no significant change\par Femoral neck: -2.9 osteoporosis, -5.4%\par Proximal radius: -1.7 osteopenia, no significant change\par \par Thyroid ultrasound March 3, 2021\par Right thyroid nodule: \par 15 x 15 x 20 mm nodule no prior for comparison\par \par Bone mineral density: 02/04/2020 \par Indication: vs. 2019\par Spine: -2.2 osteopenia, no significant change\par Total hip: -2.2 osteopenia, no significant change\par Femoral neck: -2.6 osteoporosis, no significant change\par Proximal radius: -1.4 osteopenia, no significant change\par \par Bone mineral density: 02/21/2019 \par Indication: vs. 2017 \par Spine: -2.2 osteopenia (+8.0%)\par Total hip: -2.3 osteopenia (+4.9%)\par Femoral neck: -2.6 osteoporosis, no significant change \par Proximal radius: -1.3 osteopenia, no significant change \par

## 2022-04-06 NOTE — END OF VISIT
[FreeTextEntry3] : I, Rogelio Sanchez, authored this note working as a medical scribe for Dr. Stephens.  04/06/2022.  9:30AM. This note was authored by the medical scribe for me. I have reviewed, edited, and revised the note as needed. I am in agreement with the exam findings, imaging findings, and treatment plan.  Sudeep Stephens MD

## 2022-04-06 NOTE — PHYSICAL EXAM
[Alert] : alert [Normal Sclera/Conjunctiva] : normal sclera/conjunctiva [No Neck Mass] : no neck mass was observed [Clear to Auscultation] : lungs were clear to auscultation bilaterally [Normal to Percussion] : lungs were normal to percussion [Normal S1, S2] : normal S1 and S2 [Regular Rhythm] : with a regular rhythm [Soft] : abdomen soft [Normal Anterior Cervical Nodes] : no anterior cervical lymphadenopathy [No CVA Tenderness] : no ~M costovertebral angle tenderness [No Spinal Tenderness] : no spinal tenderness [Spine Straight] : spine straight [Oriented x3] : oriented to person, place, and time

## 2022-04-06 NOTE — ASSESSMENT
[Denosumab Therapy] : Risks  and benefits of denosumab therapy were discussed with the patient including eczema, cellulitis, osteonecrosis of the jaw and atypical femur fractures [FreeTextEntry1] : 61 year old female with osteoporosis. \par \par Pt has known of low bone density since ~ 2013. She was previously intolerant to oral bisphosphonates due to UGI sx. BMD 2017 decreased while on drug holiday. Pt then had 2 doses of Prolia (last ~6/2018). She did not tolerate due to severe myalgias. Pt has previously been on long term steroids and restarted Prednisone ~2/2019. BMD 2/2019 indicates improvement in spine and tot hip, only fem neck consistent with osteoporosis (likely due to Prolia). Pt had first dose of IV Reclast 3/2019. Tolerated well. BMD 2/2020 indicates stable osteopenia in spine, total hip, and proximal radius; stable osteoporosis in femoral neck. Pt had another dose of IV Reclast 3/2021. Tolerated well. No thigh pain, no interval fx, no APR. No ONJ.\par \par Patient advised for an increased risk of future fx. Options for medical therapy discussed in detail. I discussed retrying twice a year Prolia. Risks and benefits discussed including thigh pain, interval fx, and ONJ. I discussed that pt cannot stop Prolia without expecting rapid bone loss and increase in risk for future fx unless they transition to another rx. Furthermore, there is 10 year safety data for Prolia. All questions were answered. Rx information handout provided. Pt understands and elects to restart Prolia. Will investigate insurance coverage.\par \par Endocrine hx is notable for thyroid nodule, previously benign on biopsy. No local neck pain. No dysphagia or dysphonia.\par \par Labs 2/2022 reviewed: Ca 9.8, normal. 10/2021 Vitamin D 56.9, normal. Creatinine 0.74, normal.\par \par F/u in 3 weeks for Prolia dose w/ nurse

## 2022-04-06 NOTE — HISTORY OF PRESENT ILLNESS
[Calcium (dietary)] : dietary Calcium [Denosumab (Prolia)] : Denosumab [Vitamin D (oral)] : Vitamin D orally [Zoledronic Acid (Zometa)] : Zoledronic Acid [Premat. Menopause (surgery)] : a history of premature surgical menopause [Family History of Osteoporosis] : family history of osteoporosis [FreeTextEntry1] : No significant interval health changes. No interval surgery, hospitalizations, fractures, or change in medications.\par \par Pt was first told of low bone density ~2013. Pt was being treated by PCP. She previously took an oral bisphosphonates and had severe UGI sx. She had endoscopies, no ulcers found. BMD 11/2017 vs 2014, spine: -2.8, osteoporosis ( -11.4%) Total hip: -2.5 osteoporosis (-9.6%) Fem neck: -2.8 osteoporosis (-6.8%) Proximal wrist: -1.1 osteopenia (-3.4%). Pt was then put on Prolia for 2 doses (last ~6/2018). She states on Prolia she has severe myalgias. No h/o fx. FHx of osteoporosis (mother and sister). Pt had hysterectomy at 42 due to fibroids and went through menopause ~ 52. No HRT. No hot flashes. She gets moderate amounts of dietary Ca and takes Ca. BMD 2/2019 indicates improvement in spine and tot hip, only fem neck consistent with osteoporosis (likely due to Prolia). Pt had first dose of IV Reclast 3/2019. Tolerated well. BMD 2/2020 indicates stable osteopenia in spine, total hip, and proximal radius; stable osteoporosis in femoral neck. Pt had another dose of IV Reclast 3/2021. Tolerated well. No thigh pain, no interval fx, no APR. Last DDS within past 6 months. No ONJ.\par \par Pt has fibromyalgia; previously on long term Prednisone for 6 mons in 2018.\par \par Pt had RKR 6/2019. No complications. Pt still has some discomfort from procedure. Pt still ambulates w/ cane.\par \par Endocrine hx notable for R thyroid nodule, FNA biopsy benign with Dr. Shahriar Strauss. No dysphagia or neck pain. No sx of hyper or hypothyroidism. \par \par Patient previously stateed that she went through a surgery for meningioma, the procedure went well w/o any complications. [Low Calcium Intake] : no low calcium intake

## 2022-04-12 ENCOUNTER — APPOINTMENT (OUTPATIENT)
Dept: MRI IMAGING | Facility: CLINIC | Age: 62
End: 2022-04-12

## 2022-04-12 ENCOUNTER — APPOINTMENT (OUTPATIENT)
Dept: CARDIOLOGY | Facility: CLINIC | Age: 62
End: 2022-04-12
Payer: COMMERCIAL

## 2022-04-12 PROCEDURE — ZZZZZ: CPT

## 2022-04-14 ENCOUNTER — APPOINTMENT (OUTPATIENT)
Dept: NUCLEAR MEDICINE | Facility: IMAGING CENTER | Age: 62
End: 2022-04-14

## 2022-04-18 ENCOUNTER — APPOINTMENT (OUTPATIENT)
Dept: MRI IMAGING | Facility: CLINIC | Age: 62
End: 2022-04-18
Payer: COMMERCIAL

## 2022-04-18 ENCOUNTER — OUTPATIENT (OUTPATIENT)
Dept: OUTPATIENT SERVICES | Facility: HOSPITAL | Age: 62
LOS: 1 days | End: 2022-04-18
Payer: COMMERCIAL

## 2022-04-18 DIAGNOSIS — K46.9 UNSPECIFIED ABDOMINAL HERNIA WITHOUT OBSTRUCTION OR GANGRENE: Chronic | ICD-10-CM

## 2022-04-18 DIAGNOSIS — M46.1 SACROILIITIS, NOT ELSEWHERE CLASSIFIED: ICD-10-CM

## 2022-04-18 PROCEDURE — 72148 MRI LUMBAR SPINE W/O DYE: CPT

## 2022-04-18 PROCEDURE — 72148 MRI LUMBAR SPINE W/O DYE: CPT | Mod: 26

## 2022-04-19 ENCOUNTER — RX RENEWAL (OUTPATIENT)
Age: 62
End: 2022-04-19

## 2022-04-20 ENCOUNTER — APPOINTMENT (OUTPATIENT)
Dept: NUCLEAR MEDICINE | Facility: IMAGING CENTER | Age: 62
End: 2022-04-20

## 2022-04-27 ENCOUNTER — APPOINTMENT (OUTPATIENT)
Dept: CARDIOLOGY | Facility: CLINIC | Age: 62
End: 2022-04-27
Payer: COMMERCIAL

## 2022-04-27 ENCOUNTER — APPOINTMENT (OUTPATIENT)
Dept: ENDOCRINOLOGY | Facility: CLINIC | Age: 62
End: 2022-04-27
Payer: COMMERCIAL

## 2022-04-27 PROCEDURE — A9500: CPT

## 2022-04-27 PROCEDURE — 96401 CHEMO ANTI-NEOPL SQ/IM: CPT

## 2022-04-27 PROCEDURE — 93015 CV STRESS TEST SUPVJ I&R: CPT

## 2022-04-27 PROCEDURE — 78452 HT MUSCLE IMAGE SPECT MULT: CPT

## 2022-04-27 RX ADMIN — DENOSUMAB 0 MG/ML: 60 INJECTION SUBCUTANEOUS at 00:00

## 2022-04-28 ENCOUNTER — NON-APPOINTMENT (OUTPATIENT)
Age: 62
End: 2022-04-28

## 2022-04-28 RX ORDER — DENOSUMAB 60 MG/ML
60 INJECTION SUBCUTANEOUS
Qty: 1 | Refills: 0 | Status: COMPLETED | OUTPATIENT
Start: 2022-04-27

## 2022-05-04 ENCOUNTER — RESULT REVIEW (OUTPATIENT)
Age: 62
End: 2022-05-04

## 2022-05-05 ENCOUNTER — OUTPATIENT (OUTPATIENT)
Dept: OUTPATIENT SERVICES | Facility: HOSPITAL | Age: 62
LOS: 1 days | End: 2022-05-05
Payer: COMMERCIAL

## 2022-05-05 ENCOUNTER — APPOINTMENT (OUTPATIENT)
Dept: RHEUMATOLOGY | Facility: CLINIC | Age: 62
End: 2022-05-05
Payer: COMMERCIAL

## 2022-05-05 ENCOUNTER — APPOINTMENT (OUTPATIENT)
Dept: NUCLEAR MEDICINE | Facility: IMAGING CENTER | Age: 62
End: 2022-05-05
Payer: COMMERCIAL

## 2022-05-05 VITALS
HEIGHT: 60.5 IN | OXYGEN SATURATION: 97 % | DIASTOLIC BLOOD PRESSURE: 81 MMHG | SYSTOLIC BLOOD PRESSURE: 124 MMHG | TEMPERATURE: 97 F | WEIGHT: 133 LBS | BODY MASS INDEX: 25.44 KG/M2 | HEART RATE: 74 BPM

## 2022-05-05 DIAGNOSIS — T84.84XA PAIN DUE TO INTERNAL ORTHOPEDIC PROSTHETIC DEVICES, IMPLANTS AND GRAFTS, INITIAL ENCOUNTER: ICD-10-CM

## 2022-05-05 DIAGNOSIS — L40.50 ARTHROPATHIC PSORIASIS, UNSPECIFIED: ICD-10-CM

## 2022-05-05 DIAGNOSIS — Z00.8 ENCOUNTER FOR OTHER GENERAL EXAMINATION: ICD-10-CM

## 2022-05-05 DIAGNOSIS — K46.9 UNSPECIFIED ABDOMINAL HERNIA WITHOUT OBSTRUCTION OR GANGRENE: Chronic | ICD-10-CM

## 2022-05-05 PROCEDURE — 78830 RP LOCLZJ TUM SPECT W/CT 1: CPT

## 2022-05-05 PROCEDURE — A9561: CPT

## 2022-05-05 PROCEDURE — 78315 BONE IMAGING 3 PHASE: CPT | Mod: 26

## 2022-05-05 PROCEDURE — 78830 RP LOCLZJ TUM SPECT W/CT 1: CPT | Mod: 26

## 2022-05-05 PROCEDURE — 78315 BONE IMAGING 3 PHASE: CPT

## 2022-05-05 PROCEDURE — 99215 OFFICE O/P EST HI 40 MIN: CPT

## 2022-05-05 RX ORDER — OXCARBAZEPINE 300 MG/1
300 TABLET, FILM COATED ORAL
Qty: 60 | Refills: 2 | Status: DISCONTINUED | COMMUNITY
Start: 2021-08-31 | End: 2022-05-05

## 2022-05-05 RX ORDER — SECUKINUMAB 150 MG/ML
150 INJECTION SUBCUTANEOUS
Qty: 2 | Refills: 0 | Status: DISCONTINUED | COMMUNITY
Start: 2020-08-06 | End: 2022-05-05

## 2022-05-05 RX ORDER — METHOTREXATE 10 MG/.4ML
10 INJECTION, SOLUTION SUBCUTANEOUS
Qty: 4 | Refills: 3 | Status: DISCONTINUED | COMMUNITY
Start: 2021-10-26 | End: 2022-05-05

## 2022-05-05 RX ORDER — OMEPRAZOLE 40 MG/1
40 CAPSULE, DELAYED RELEASE ORAL
Qty: 90 | Refills: 2 | Status: DISCONTINUED | COMMUNITY
Start: 2017-08-31 | End: 2022-05-05

## 2022-05-05 RX ORDER — ZOLEDRONIC ACID 5 MG/100ML
5 INJECTION, SOLUTION INTRAVENOUS
Qty: 1 | Refills: 0 | Status: DISCONTINUED | COMMUNITY
Start: 2021-03-03 | End: 2022-05-05

## 2022-05-05 RX ORDER — DIPHENHYDRAMINE, LIDOCAINE, NYSTATIN
KIT ORAL
Refills: 0 | Status: DISCONTINUED | COMMUNITY
End: 2022-05-05

## 2022-05-06 ENCOUNTER — APPOINTMENT (OUTPATIENT)
Dept: ORTHOPEDIC SURGERY | Facility: CLINIC | Age: 62
End: 2022-05-06
Payer: COMMERCIAL

## 2022-05-06 DIAGNOSIS — T84.84XA PAIN DUE TO INTERNAL ORTHOPEDIC PROSTHETIC DEVICES, IMPLANTS AND GRAFTS, INITIAL ENCOUNTER: ICD-10-CM

## 2022-05-06 DIAGNOSIS — Z96.651 PAIN DUE TO INTERNAL ORTHOPEDIC PROSTHETIC DEVICES, IMPLANTS AND GRAFTS, INITIAL ENCOUNTER: ICD-10-CM

## 2022-05-06 PROCEDURE — 99442: CPT

## 2022-05-09 LAB
25(OH)D3 SERPL-MCNC: 43.4 NG/ML
ALBUMIN SERPL ELPH-MCNC: 4.9 G/DL
ALP BLD-CCNC: 71 U/L
ALT SERPL-CCNC: 15 U/L
ANION GAP SERPL CALC-SCNC: 13 MMOL/L
AST SERPL-CCNC: 16 U/L
BASOPHILS # BLD AUTO: 0.01 K/UL
BASOPHILS NFR BLD AUTO: 0.1 %
BILIRUB SERPL-MCNC: 0.3 MG/DL
BUN SERPL-MCNC: 18 MG/DL
CALCIUM SERPL-MCNC: 9.8 MG/DL
CHLORIDE SERPL-SCNC: 102 MMOL/L
CHOLEST SERPL-MCNC: 230 MG/DL
CO2 SERPL-SCNC: 25 MMOL/L
CREAT SERPL-MCNC: 0.86 MG/DL
CRP SERPL-MCNC: <3 MG/L
EGFR: 78 ML/MIN/1.73M2
EOSINOPHIL # BLD AUTO: 0.01 K/UL
EOSINOPHIL NFR BLD AUTO: 0.1 %
ERYTHROCYTE [SEDIMENTATION RATE] IN BLOOD BY WESTERGREN METHOD: 2 MM/HR
GLUCOSE SERPL-MCNC: 90 MG/DL
HCT VFR BLD CALC: 43.4 %
HDLC SERPL-MCNC: 65 MG/DL
HGB BLD-MCNC: 14.6 G/DL
IMM GRANULOCYTES NFR BLD AUTO: 0.5 %
LDLC SERPL CALC-MCNC: 143 MG/DL
LYMPHOCYTES # BLD AUTO: 3.05 K/UL
LYMPHOCYTES NFR BLD AUTO: 35.4 %
MAN DIFF?: NORMAL
MCHC RBC-ENTMCNC: 31.2 PG
MCHC RBC-ENTMCNC: 33.6 GM/DL
MCV RBC AUTO: 92.7 FL
MONOCYTES # BLD AUTO: 0.68 K/UL
MONOCYTES NFR BLD AUTO: 7.9 %
NEUTROPHILS # BLD AUTO: 4.83 K/UL
NEUTROPHILS NFR BLD AUTO: 56 %
NONHDLC SERPL-MCNC: 165 MG/DL
PLATELET # BLD AUTO: 260 K/UL
POTASSIUM SERPL-SCNC: 4.5 MMOL/L
PROT SERPL-MCNC: 7.5 G/DL
RBC # BLD: 4.68 M/UL
RBC # FLD: 13 %
SODIUM SERPL-SCNC: 140 MMOL/L
TRIGL SERPL-MCNC: 110 MG/DL
WBC # FLD AUTO: 8.62 K/UL

## 2022-05-10 ENCOUNTER — APPOINTMENT (OUTPATIENT)
Dept: CARDIOLOGY | Facility: CLINIC | Age: 62
End: 2022-05-10

## 2022-05-18 ENCOUNTER — APPOINTMENT (OUTPATIENT)
Dept: ORTHOPEDIC SURGERY | Facility: CLINIC | Age: 62
End: 2022-05-18
Payer: COMMERCIAL

## 2022-05-18 VITALS — BODY MASS INDEX: 25.44 KG/M2 | WEIGHT: 133 LBS | HEIGHT: 60.5 IN

## 2022-05-18 DIAGNOSIS — M46.1 SACROILIITIS, NOT ELSEWHERE CLASSIFIED: ICD-10-CM

## 2022-05-18 PROCEDURE — 99214 OFFICE O/P EST MOD 30 MIN: CPT

## 2022-06-08 ENCOUNTER — APPOINTMENT (OUTPATIENT)
Dept: PHYSICAL MEDICINE AND REHAB | Facility: CLINIC | Age: 62
End: 2022-06-08

## 2022-06-20 ENCOUNTER — APPOINTMENT (OUTPATIENT)
Dept: RHEUMATOLOGY | Facility: CLINIC | Age: 62
End: 2022-06-20

## 2022-06-23 ENCOUNTER — APPOINTMENT (OUTPATIENT)
Dept: NEUROLOGY | Facility: CLINIC | Age: 62
End: 2022-06-23
Payer: COMMERCIAL

## 2022-06-23 VITALS
HEIGHT: 60.5 IN | WEIGHT: 133 LBS | BODY MASS INDEX: 25.44 KG/M2 | SYSTOLIC BLOOD PRESSURE: 125 MMHG | DIASTOLIC BLOOD PRESSURE: 74 MMHG | HEART RATE: 73 BPM

## 2022-06-23 DIAGNOSIS — G89.29 PAIN IN RIGHT SHOULDER: ICD-10-CM

## 2022-06-23 DIAGNOSIS — M47.812 SPONDYLOSIS W/OUT MYELOPATHY OR RADICULOPATHY, CERVICAL REGION: ICD-10-CM

## 2022-06-23 DIAGNOSIS — G40.109 LOCALIZATION-RELATED (FOCAL) (PARTIAL) SYMPTOMATIC EPILEPSY AND EPILEPTIC SYNDROMES WITH SIMPLE PARTIAL SEIZURES, NOT INTRACTABLE, W/OUT STATUS EPILEPTICUS: ICD-10-CM

## 2022-06-23 DIAGNOSIS — M25.511 PAIN IN RIGHT SHOULDER: ICD-10-CM

## 2022-06-23 PROCEDURE — 99213 OFFICE O/P EST LOW 20 MIN: CPT

## 2022-06-25 PROBLEM — G40.109 PARTIAL SEIZURE DISORDER: Status: RESOLVED | Noted: 2021-08-07 | Resolved: 2022-06-25

## 2022-06-25 NOTE — ASSESSMENT
[FreeTextEntry1] : Patient has been seizure-free on gabapentin 1800 mg daily.  Her EEG showed no epileptiform activity and I agree that she is able to increase her dosage of cannabis.

## 2022-06-25 NOTE — HISTORY OF PRESENT ILLNESS
[FreeTextEntry1] : 61-year-old woman seen 10 months ago. She was found to have an incidental right temporal lobe meningioma after a fall and brain imaging 5 !/2 years ago.  This meningioma was monitored for several years and when it started to show evidence of slow growth and surrounding edema it was resected 11 months ago at [facility].  At that time she was placed on Keppra which was discontinued after 2 months.  She also has a history of psoriatic arthritis and is on Cosentyx.  She sees a rheumatologist who feels patient has fibromyalgia and has prescribed gabapentin currently 1200 mg 3 times daily.  \par \par She was referred to my office after 2 episodes that occurred 1 year ago when she fell out of bed onto a carpeted floor and was momentarily confused.  Since those 2 episodes she placed her mattress on the floor.  She also has been complaining of a feeling of retropulsion when she walks up a staircase.  And she also reports missing steps when she walks downstairs.  She also has been forgetful.\par \par She was sent for routine EEG which showed no epileptiform activity.  Slow activity was noted over the right temporal area (report in E HR).  She has had no further episodes of altered consciousness.  She complains of daily fatigue.\par \par She returns today to report that she has had no seizures over the last 10 months on gabapentin 600 mg 3 times daily.  She reports that Dr. Miller who she sees for pain management was reluctant to increase her cannabis dosage because of her history of partial seizures that occurred last year.

## 2022-06-27 ENCOUNTER — APPOINTMENT (OUTPATIENT)
Dept: PAIN MANAGEMENT | Facility: CLINIC | Age: 62
End: 2022-06-27

## 2022-06-27 ENCOUNTER — RX RENEWAL (OUTPATIENT)
Age: 62
End: 2022-06-27

## 2022-07-07 ENCOUNTER — APPOINTMENT (OUTPATIENT)
Dept: OTOLARYNGOLOGY | Facility: CLINIC | Age: 62
End: 2022-07-07

## 2022-07-07 ENCOUNTER — APPOINTMENT (OUTPATIENT)
Dept: CARDIOLOGY | Facility: CLINIC | Age: 62
End: 2022-07-07

## 2022-07-07 ENCOUNTER — APPOINTMENT (OUTPATIENT)
Dept: ORTHOPEDIC SURGERY | Facility: CLINIC | Age: 62
End: 2022-07-07

## 2022-07-07 VITALS
HEIGHT: 60.5 IN | HEART RATE: 73 BPM | SYSTOLIC BLOOD PRESSURE: 125 MMHG | DIASTOLIC BLOOD PRESSURE: 74 MMHG | BODY MASS INDEX: 25.44 KG/M2 | WEIGHT: 133 LBS

## 2022-07-07 VITALS
WEIGHT: 132 LBS | TEMPERATURE: 97.8 F | SYSTOLIC BLOOD PRESSURE: 117 MMHG | DIASTOLIC BLOOD PRESSURE: 74 MMHG | HEIGHT: 60 IN | BODY MASS INDEX: 25.91 KG/M2 | HEART RATE: 69 BPM

## 2022-07-07 DIAGNOSIS — H92.01 OTALGIA, RIGHT EAR: ICD-10-CM

## 2022-07-07 DIAGNOSIS — M26.629 ARTHRALGIA OF TEMPOROMANDIBULAR JOINT,: ICD-10-CM

## 2022-07-07 PROCEDURE — 99213 OFFICE O/P EST LOW 20 MIN: CPT

## 2022-07-07 PROCEDURE — 73030 X-RAY EXAM OF SHOULDER: CPT | Mod: LT

## 2022-07-07 PROCEDURE — 31237 NSL/SINS NDSC SURG BX POLYPC: CPT | Mod: 50,58

## 2022-07-07 PROCEDURE — 99213 OFFICE O/P EST LOW 20 MIN: CPT | Mod: 25

## 2022-07-07 PROCEDURE — 99203 OFFICE O/P NEW LOW 30 MIN: CPT

## 2022-07-07 RX ORDER — OMEPRAZOLE 20 MG/1
20 CAPSULE, DELAYED RELEASE ORAL
Qty: 30 | Refills: 0 | Status: ACTIVE | COMMUNITY
Start: 2022-05-17

## 2022-07-07 RX ORDER — ATORVASTATIN CALCIUM 10 MG/1
10 TABLET, FILM COATED ORAL
Qty: 30 | Refills: 0 | Status: ACTIVE | COMMUNITY
Start: 2022-05-17

## 2022-07-07 NOTE — CONSULT LETTER
[FreeTextEntry1] : Dear Dr. PRUDENCIO MCDANIEL \par I had the pleasure of evaluating your patient SHAWNA NICK, thank you for allowing us to participate in their care. please see full note detailing our visit below.\par If you have any questions, please do not hesitate to call me and I would be happy to discuss further. \par \par Greg Meredith M.D.\par Attending Physician,  \par Department of Otolaryngology - Head and Neck Surgery\par [facility] \par Office: [phone]\par Fax: [phone]\par \par

## 2022-07-07 NOTE — CONSULT LETTER
[FreeTextEntry1] : Dear Dr. PRUDENCIO MCDANIEL \par I had the pleasure of evaluating your patient SHAWNA NICK, thank you for allowing us to participate in their care. please see full note detailing our visit below.\par If you have any questions, please do not hesitate to call me and I would be happy to discuss further. \par \par Greg Meredith M.D.\par Attending Physician,  \par Department of Otolaryngology - Head and Neck Surgery\par [facility] \par Office: [phone]\par Fax: [phone]\par \par

## 2022-07-07 NOTE — PHYSICAL EXAM
[Midline] : trachea located in midline position [Normal] : no rashes [de-identified] : right pterygoid tenderness, pain on opening mouth wide

## 2022-07-07 NOTE — HISTORY OF PRESENT ILLNESS
[de-identified] : 61 year old female presents for evaluation of right otalgia.\par Last clinical visit in 2018.\par History of right ear Meningioma removed by Dr. Liu (2020).\par Presents with intermittent sharp right otalgia which began 3-4 months ago.\par States last MRI Dec. 2021 was unremarkable. \par Denies drainage, tinnitus and hearing changes.

## 2022-07-07 NOTE — ASSESSMENT
[FreeTextEntry1] : Pt with likely TMJ disease clear ear exam, Hx meningioma following with neurosurg\par will do precautions\par if persist will consider scope/ imaging etc \par \par Patient likely has inflammation of the temporomandibular joint as a cause of their discomfit. I discussed with them the pathophysiology of TMJ disorders and we reviewed treatment options. At this point we will begin with a regiment to decrease inflammation, local muscle spasms and demands on the joint to allow for recovery. We also discussed exercises and stretches to preform to decrease pain and relapse. Reviewed possible further interventions including muscle relaxants and injections. They will let me know if it does not completely resolve.\par

## 2022-07-07 NOTE — HISTORY OF PRESENT ILLNESS
[de-identified] : 61 year old female presents for evaluation of right otalgia.\par Last clinical visit in 2018.\par History of right ear Meningioma removed by Dr. Liu (2020).\par Presents with intermittent sharp right otalgia which began 3-4 months ago.\par States last MRI Dec. 2021 was unremarkable. \par Denies drainage, tinnitus and hearing changes.

## 2022-07-07 NOTE — END OF VISIT
[FreeTextEntry3] : I personally saw and examined BAILEY VARELA in detail. I spoke to Digna DUMONT regarding the assessment and plan of care.  I preformed the procedures and I reviewed the above assessment and plan of care, and agree. I have made changes in changes in the body of the note where appropriate.\par

## 2022-07-07 NOTE — PHYSICAL EXAM
[Midline] : trachea located in midline position [Normal] : no rashes [de-identified] : right pterygoid tenderness, pain on opening mouth wide

## 2022-07-11 ENCOUNTER — APPOINTMENT (OUTPATIENT)
Dept: PAIN MANAGEMENT | Facility: CLINIC | Age: 62
End: 2022-07-11

## 2022-07-13 ENCOUNTER — APPOINTMENT (OUTPATIENT)
Dept: PAIN MANAGEMENT | Facility: CLINIC | Age: 62
End: 2022-07-13

## 2022-07-13 VITALS
HEART RATE: 83 BPM | SYSTOLIC BLOOD PRESSURE: 119 MMHG | WEIGHT: 133 LBS | BODY MASS INDEX: 26.11 KG/M2 | DIASTOLIC BLOOD PRESSURE: 74 MMHG | HEIGHT: 60 IN

## 2022-07-13 PROCEDURE — 99215 OFFICE O/P EST HI 40 MIN: CPT

## 2022-07-13 NOTE — HISTORY OF PRESENT ILLNESS
[FreeTextEntry1] : 60 y/o F with Pmhx right temporal meningioma s/p resection, partial seizures, Psoriatic Arthritis, Lumbar spondylosis, Fibromyalgia with chronic pain who presents today for follow up of chronic pain. Since her last visit, she has followed up with Dr. Mota for her partial seizures and has been seizure free on Gabapentin 1800mg/day. Today she reports chronic constant pain , primarily in the left shoulder, elbow, hand, hip 8/10 on average worse at night.  She has previously tried MM 1: without benefit and has tried smoking smoking recreational marijuana a few times. She has seen Dr. Rothman for lower back pain -radicular who recommended SI  joint injections. \par \par Denies dysrhythmia or hallucinations.  \par \par Prior meds: Duloxetine, Savella, Amitriptyline, Celebrex, Tramadol \par Current meds include: Gabapentin 600 mg 3x/day, diclofenac 75 mg BID,  Motrin and APAP prn, TALAZ- Psoriatic arthritis \par

## 2022-07-13 NOTE — ASSESSMENT
[FreeTextEntry1] : 62 y/o F with chronic pain syndrome - multifactorial \par \par - Topical diclofenac \par -  Trial of LDN \par -Continue PT \par -Consider SI joint injections \par -UDS done today \par -MM recertification will be provided - and will consider increasing to trial of high thc given lack of response to 1:1\par \par I-Stop reviewed,  reference #: [id number]\par No signs of aberrant behavior and will continue to monitor for signs of toxicity.\par Reminded to continue to avoid alcohol.\par \par Ms. SHAWNA NICK denies any history of psychosis, immediate family history of psychosis or arrhythmia. In my opinion She would benefit from medical marijuana. In regards to ratio, I believe She would benefit from a one-to-one plus a low THC: high CBD as well as trial of high thc . She  has been advised of the potential risks and benefits of this agent. She has also been advised been advised not to drive up to four hours after taking medical marijuana.Patient has signed and fully understands Medical Cannabis Treatment Agreement our guidelines for prescription medication/cannabis and drug screening.  Medical marijuana agreement was reviewed and signed by patient. \par \par \par \par \par

## 2022-07-18 ENCOUNTER — APPOINTMENT (OUTPATIENT)
Dept: RHEUMATOLOGY | Facility: CLINIC | Age: 62
End: 2022-07-18

## 2022-07-18 ENCOUNTER — LABORATORY RESULT (OUTPATIENT)
Age: 62
End: 2022-07-18

## 2022-07-18 VITALS
OXYGEN SATURATION: 97 % | DIASTOLIC BLOOD PRESSURE: 81 MMHG | SYSTOLIC BLOOD PRESSURE: 124 MMHG | TEMPERATURE: 97.9 F | BODY MASS INDEX: 25.52 KG/M2 | WEIGHT: 130 LBS | HEART RATE: 76 BPM | HEIGHT: 60 IN

## 2022-07-18 DIAGNOSIS — M25.531 PAIN IN RIGHT WRIST: ICD-10-CM

## 2022-07-18 DIAGNOSIS — M25.532 PAIN IN RIGHT WRIST: ICD-10-CM

## 2022-07-18 DIAGNOSIS — M79.602 PAIN IN LEFT ARM: ICD-10-CM

## 2022-07-18 PROCEDURE — 99215 OFFICE O/P EST HI 40 MIN: CPT

## 2022-07-18 RX ORDER — METHOTREXATE 15 MG/.4ML
15 INJECTION, SOLUTION SUBCUTANEOUS
Qty: 2 | Refills: 0 | Status: DISCONTINUED | COMMUNITY
Start: 2022-02-10 | End: 2022-07-18

## 2022-07-19 LAB
ALBUMIN SERPL ELPH-MCNC: 4.5 G/DL
ALP BLD-CCNC: 53 U/L
ALT SERPL-CCNC: 14 U/L
ANION GAP SERPL CALC-SCNC: 10 MMOL/L
APPEARANCE: CLEAR
AST SERPL-CCNC: 18 U/L
BACTERIA UR CULT: NORMAL
BASOPHILS # BLD AUTO: 0.03 K/UL
BASOPHILS NFR BLD AUTO: 0.6 %
BILIRUB SERPL-MCNC: 0.3 MG/DL
BILIRUBIN URINE: NEGATIVE
BLOOD URINE: ABNORMAL
BUN SERPL-MCNC: 14 MG/DL
CALCIUM SERPL-MCNC: 9.3 MG/DL
CHLORIDE SERPL-SCNC: 107 MMOL/L
CHOLEST SERPL-MCNC: 184 MG/DL
CO2 SERPL-SCNC: 25 MMOL/L
COLOR: NORMAL
CREAT SERPL-MCNC: 0.78 MG/DL
CREAT SPEC-SCNC: 137 MG/DL
CREAT/PROT UR: 0.1 RATIO
CRP SERPL-MCNC: <3 MG/L
EGFR: 87 ML/MIN/1.73M2
EOSINOPHIL # BLD AUTO: 0.09 K/UL
EOSINOPHIL NFR BLD AUTO: 1.7 %
ERYTHROCYTE [SEDIMENTATION RATE] IN BLOOD BY WESTERGREN METHOD: 3 MM/HR
ESTIMATED AVERAGE GLUCOSE: 108 MG/DL
GLUCOSE QUALITATIVE U: NEGATIVE
GLUCOSE SERPL-MCNC: 96 MG/DL
HBA1C MFR BLD HPLC: 5.4 %
HCT VFR BLD CALC: 41.7 %
HDLC SERPL-MCNC: 67 MG/DL
HGB BLD-MCNC: 13.9 G/DL
IMM GRANULOCYTES NFR BLD AUTO: 0 %
KETONES URINE: NEGATIVE
LDLC SERPL CALC-MCNC: 99 MG/DL
LEUKOCYTE ESTERASE URINE: NEGATIVE
LYMPHOCYTES # BLD AUTO: 2.48 K/UL
LYMPHOCYTES NFR BLD AUTO: 47.4 %
MAN DIFF?: NORMAL
MCHC RBC-ENTMCNC: 30.2 PG
MCHC RBC-ENTMCNC: 33.3 GM/DL
MCV RBC AUTO: 90.7 FL
MONOCYTES # BLD AUTO: 0.35 K/UL
MONOCYTES NFR BLD AUTO: 6.7 %
NEUTROPHILS # BLD AUTO: 2.28 K/UL
NEUTROPHILS NFR BLD AUTO: 43.6 %
NITRITE URINE: NEGATIVE
NONHDLC SERPL-MCNC: 117 MG/DL
PH URINE: 6.5
PLATELET # BLD AUTO: 203 K/UL
POTASSIUM SERPL-SCNC: 4.3 MMOL/L
PROT SERPL-MCNC: 6.8 G/DL
PROT UR-MCNC: 7 MG/DL
PROTEIN URINE: NORMAL
RBC # BLD: 4.6 M/UL
RBC # FLD: 12.5 %
SODIUM SERPL-SCNC: 142 MMOL/L
SPECIFIC GRAVITY URINE: 1.02
TRIGL SERPL-MCNC: 90 MG/DL
TSH SERPL-ACNC: 0.31 UIU/ML
UROBILINOGEN URINE: NORMAL
WBC # FLD AUTO: 5.23 K/UL

## 2022-07-26 ENCOUNTER — APPOINTMENT (OUTPATIENT)
Dept: ORTHOPEDIC SURGERY | Facility: CLINIC | Age: 62
End: 2022-07-26

## 2022-07-26 ENCOUNTER — NON-APPOINTMENT (OUTPATIENT)
Age: 62
End: 2022-07-26

## 2022-07-26 VITALS — BODY MASS INDEX: 25.52 KG/M2 | WEIGHT: 130 LBS | HEIGHT: 60 IN

## 2022-07-26 DIAGNOSIS — M54.16 RADICULOPATHY, LUMBAR REGION: ICD-10-CM

## 2022-07-26 PROCEDURE — 99214 OFFICE O/P EST MOD 30 MIN: CPT

## 2022-08-01 NOTE — PHYSICAL EXAM
[de-identified] : Oriented to time, place, person\par Mood: Normal\par Affect: Normal\par Appearance: Healthy, well appearing, no acute distress.\par Gait: Normal\par Assistive Devices: None\par \par Left shoulder exam:\par \par Inspection: No malalignment, No defects, No atrophy\par Skin: No masses, No lesions\par Neck: Negative Spurling, full ROM, no pain with ROM\par AROM: FF to 180, abduction to 90, ER to 70, IR to upper lumbar\par Painful arc ROM: none\par Tenderness: No bicipital tenderness, no tenderness to greater tuberosity/RTC insertion, no anterior shoulder/lesser tuberosity tenderness\par Strength: 4+/5 ER, 5/5 IR in adduction, 5/5 supraspinatus testing, negative Oconee's test\par AC joint: No TTP/pain with cross arm testing\par Biceps: Speed Negative, Yergason Negative \par Impingement test: Negative Clark, Negative Neer\par Vasc: 2+ radial pulse \par Stability: Stable \par Neuro: AIN, PIN, Ulnar nerve intact to motor\par Sensation: Intact to light touch throughout \par \par Left elbow exam\par \par Skin: Clean, dry, intact. No ecchymosis. No swelling. No palpable joint effusion.\par ROM: Full extension/flexion, full supination/pronation.\par Painful ROM: Lateral elbow pain with resisted wrist extension\par Tenderness: Hypersensitivity medial epicondyle pain. Hypersensitivity lateral epicondyle pain (ECRB). No olecranon pain. No pain at radial head. No pain to radial tunnel.\par Strength: 5/5 elbow flexion, 5/5 elbow extension, 5/5 supination, 5/5 pronation\par Stability: Stable to vaus/valgus stress\par Vasc: 2+ radial pulse, <2s cap refill\par Sensation: In tact to light touch throughout\par Neuro: + Tinel at ulnar canal, AIN/PIN/Ulnar nerve in tact to motor/sensation.  [de-identified] : The following radiographs were ordered and read by me during this patients visit. I reviewed each radiograph in detail with the patient and discussed the findings as highlighted below.\par \par 3 views of left shoulder were obtained today, 07/07/2022, that show no acute fracture or dislocation. There is no glenohumeral and mild AC joint degenerative change seen. Type II acromion. There is no significant malalignment. There is post-op changes at the AC joint. \par \par Images were reviewed dated 8.27.2020\par \par 3 views of the left elbow that show no acute fracture or dislocation. There is no degenerative change seen. There is no gross malalignment. No significant other obvious osseous abnormality, otherwise unremarkable.

## 2022-08-01 NOTE — HISTORY OF PRESENT ILLNESS
[de-identified] : 61 year old RHD female s/p left shoulder arthroscopy 15 years ago (removal of cyst), psoriatic arthritis, fibromyalgia, presents today with left arm pain x 6 months. Treated by Rheum for chronic pain syndromes. The pain is constant worse with lifting and reaching behind. Taking Gabapentin/ Motrin/Tylenol, in pain manangement. She was seen by Dr. Fletcher dx with left shoulder tendonitis/right medial epicondylitis recommend for PT patient does not recall if she went. Also had local injection therapy in nov 2020. She is also being treated by Dr. Rothman for C4-C7 DDD/stenosis, and L3-L5 DDD/stenosis. She has also been seen by Dr. Horne for hand pains/trigger fingers. \par \par The patient's past medical history, past surgical history, medications and allergies were reviewed by me today with the patient and documented accordingly. In addition, the patient's family and social history, which were noncontributory to this visit, were reviewed also.

## 2022-08-01 NOTE — DISCUSSION/SUMMARY
[de-identified] : 62 y/o female with left elbow ulnar neurtitis\par \par Patient presents for evaluation of left upper extremity pain.  Patient has chronic pain management issues, and complex past medical history.  Patient has had history of multiple musculoskeletal complaints including previous evaluation for left chronic shoulder RTC tendinopathy.  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 Discussed that there are multiple signs of entrapment throughout the course of the ulnar nerve as it traverses behind the elbow. Symptoms include paresthesias of the ulnar aspect of the hand, as well as night symptoms. Patient does not have any significant atrophy or clawing to suggest chronicity of symptoms, and discussed further management and treatment. \par \par Typically, initial treatment is conservative with anti-inflammatories, activity modification, and nighttime elbow extension splinting for mild to symptoms. Can be effective in management, but notoriously symptoms may progress and operative intervention with decompression of the nerve can be performed. Patient may also consider trial of local injection. Patient also has some mild continued dysfunction at the median nerve consistent with chronic carpal tunnel symptoms. \par \par Given majority of symptoms are located to the left elbow on today's examination, I have recommended follow-up with Ortho Hand for further consideration of acute treatment.  Would avoid MRI imaging as will likely confound patient presentation.\par \par Follow-up Ortho Hand for UE compressive neuropathy symptoms.

## 2022-08-01 NOTE — ADDENDUM
[FreeTextEntry1] : This note was written by Moni Molina on 07/07/2022 acting solely as a scribe for Dr. Brad Braga.\par \par All medical record entries made by the Scribe were at my, Dr. Brad Braga, direction and personally dictated by me on 07/07/2022. I have personally reviewed the chart and agree that the record accurately reflects my personal performance of the history, physical exam, assessment and plan.

## 2022-08-08 ENCOUNTER — APPOINTMENT (OUTPATIENT)
Dept: ORTHOPEDIC SURGERY | Facility: CLINIC | Age: 62
End: 2022-08-08

## 2022-08-08 DIAGNOSIS — G56.22 LESION OF ULNAR NERVE, LEFT UPPER LIMB: ICD-10-CM

## 2022-08-08 DIAGNOSIS — M77.12 LATERAL EPICONDYLITIS, LEFT ELBOW: ICD-10-CM

## 2022-08-08 DIAGNOSIS — M65.312 TRIGGER THUMB, LEFT THUMB: ICD-10-CM

## 2022-08-08 PROCEDURE — 99204 OFFICE O/P NEW MOD 45 MIN: CPT | Mod: 57

## 2022-08-10 ENCOUNTER — APPOINTMENT (OUTPATIENT)
Dept: CT IMAGING | Facility: CLINIC | Age: 62
End: 2022-08-10

## 2022-08-10 ENCOUNTER — OUTPATIENT (OUTPATIENT)
Dept: OUTPATIENT SERVICES | Facility: HOSPITAL | Age: 62
LOS: 1 days | End: 2022-08-10
Payer: COMMERCIAL

## 2022-08-10 ENCOUNTER — RX RENEWAL (OUTPATIENT)
Age: 62
End: 2022-08-10

## 2022-08-10 DIAGNOSIS — K46.9 UNSPECIFIED ABDOMINAL HERNIA WITHOUT OBSTRUCTION OR GANGRENE: Chronic | ICD-10-CM

## 2022-08-10 DIAGNOSIS — R31.29 OTHER MICROSCOPIC HEMATURIA: ICD-10-CM

## 2022-08-10 DIAGNOSIS — Z00.8 ENCOUNTER FOR OTHER GENERAL EXAMINATION: ICD-10-CM

## 2022-08-10 PROCEDURE — 74178 CT ABD&PLV WO CNTR FLWD CNTR: CPT

## 2022-08-10 PROCEDURE — 74178 CT ABD&PLV WO CNTR FLWD CNTR: CPT | Mod: 26

## 2022-08-22 ENCOUNTER — APPOINTMENT (OUTPATIENT)
Dept: ORTHOPEDIC SURGERY | Facility: HOSPITAL | Age: 62
End: 2022-08-22

## 2022-08-22 ENCOUNTER — NON-APPOINTMENT (OUTPATIENT)
Age: 62
End: 2022-08-22

## 2022-08-26 ENCOUNTER — APPOINTMENT (OUTPATIENT)
Dept: OTOLARYNGOLOGY | Facility: CLINIC | Age: 62
End: 2022-08-26

## 2022-09-02 ENCOUNTER — APPOINTMENT (OUTPATIENT)
Dept: ORTHOPEDIC SURGERY | Facility: CLINIC | Age: 62
End: 2022-09-02

## 2022-09-12 ENCOUNTER — APPOINTMENT (OUTPATIENT)
Dept: RHEUMATOLOGY | Facility: CLINIC | Age: 62
End: 2022-09-12

## 2022-09-12 ENCOUNTER — APPOINTMENT (OUTPATIENT)
Dept: ORTHOPEDIC SURGERY | Facility: CLINIC | Age: 62
End: 2022-09-12

## 2022-09-15 ENCOUNTER — RX RENEWAL (OUTPATIENT)
Age: 62
End: 2022-09-15

## 2022-09-22 ENCOUNTER — APPOINTMENT (OUTPATIENT)
Dept: ULTRASOUND IMAGING | Facility: CLINIC | Age: 62
End: 2022-09-22

## 2022-09-22 ENCOUNTER — OUTPATIENT (OUTPATIENT)
Dept: OUTPATIENT SERVICES | Facility: HOSPITAL | Age: 62
LOS: 1 days | End: 2022-09-22
Payer: COMMERCIAL

## 2022-09-22 DIAGNOSIS — K46.9 UNSPECIFIED ABDOMINAL HERNIA WITHOUT OBSTRUCTION OR GANGRENE: Chronic | ICD-10-CM

## 2022-09-22 DIAGNOSIS — R93.5 ABNORMAL FINDINGS ON DIAGNOSTIC IMAGING OF OTHER ABDOMINAL REGIONS, INCLUDING RETROPERITONEUM: ICD-10-CM

## 2022-09-22 DIAGNOSIS — Z00.8 ENCOUNTER FOR OTHER GENERAL EXAMINATION: ICD-10-CM

## 2022-09-22 PROCEDURE — 76700 US EXAM ABDOM COMPLETE: CPT

## 2022-09-22 PROCEDURE — 76700 US EXAM ABDOM COMPLETE: CPT | Mod: 26

## 2022-09-29 ENCOUNTER — APPOINTMENT (OUTPATIENT)
Dept: NEUROLOGY | Facility: CLINIC | Age: 62
End: 2022-09-29

## 2022-10-06 ENCOUNTER — RX RENEWAL (OUTPATIENT)
Age: 62
End: 2022-10-06

## 2022-10-13 ENCOUNTER — APPOINTMENT (OUTPATIENT)
Dept: RHEUMATOLOGY | Facility: CLINIC | Age: 62
End: 2022-10-13

## 2022-10-13 VITALS
DIASTOLIC BLOOD PRESSURE: 63 MMHG | OXYGEN SATURATION: 98 % | HEART RATE: 73 BPM | HEIGHT: 60 IN | TEMPERATURE: 97.7 F | BODY MASS INDEX: 25.72 KG/M2 | WEIGHT: 131 LBS | RESPIRATION RATE: 16 BRPM | SYSTOLIC BLOOD PRESSURE: 100 MMHG

## 2022-10-13 DIAGNOSIS — K14.6 GLOSSODYNIA: ICD-10-CM

## 2022-10-13 DIAGNOSIS — M25.562 PAIN IN RIGHT KNEE: ICD-10-CM

## 2022-10-13 DIAGNOSIS — M25.561 PAIN IN RIGHT KNEE: ICD-10-CM

## 2022-10-13 DIAGNOSIS — G89.29 PAIN IN RIGHT KNEE: ICD-10-CM

## 2022-10-13 PROCEDURE — 99213 OFFICE O/P EST LOW 20 MIN: CPT

## 2022-10-14 ENCOUNTER — RX RENEWAL (OUTPATIENT)
Age: 62
End: 2022-10-14

## 2022-10-24 ENCOUNTER — APPOINTMENT (OUTPATIENT)
Dept: PAIN MANAGEMENT | Facility: CLINIC | Age: 62
End: 2022-10-24

## 2022-10-24 LAB
ALBUMIN SERPL ELPH-MCNC: 4.6 G/DL
ALP BLD-CCNC: 55 U/L
ALT SERPL-CCNC: 14 U/L
ANION GAP SERPL CALC-SCNC: 13 MMOL/L
AST SERPL-CCNC: 19 U/L
BASOPHILS # BLD AUTO: 0.02 K/UL
BASOPHILS NFR BLD AUTO: 0.4 %
BILIRUB SERPL-MCNC: 0.4 MG/DL
BUN SERPL-MCNC: 16 MG/DL
CALCIUM SERPL-MCNC: 9.6 MG/DL
CHLORIDE SERPL-SCNC: 107 MMOL/L
CHOLEST SERPL-MCNC: 153 MG/DL
CO2 SERPL-SCNC: 24 MMOL/L
CREAT SERPL-MCNC: 0.88 MG/DL
CRP SERPL-MCNC: <3 MG/L
EGFR: 75 ML/MIN/1.73M2
EOSINOPHIL # BLD AUTO: 0.08 K/UL
EOSINOPHIL NFR BLD AUTO: 1.7 %
ERYTHROCYTE [SEDIMENTATION RATE] IN BLOOD BY WESTERGREN METHOD: 7 MM/HR
GLUCOSE SERPL-MCNC: 82 MG/DL
HCT VFR BLD CALC: 39.3 %
HDLC SERPL-MCNC: 59 MG/DL
HGB BLD-MCNC: 12.8 G/DL
IMM GRANULOCYTES NFR BLD AUTO: 0 %
LDLC SERPL CALC-MCNC: 80 MG/DL
LYMPHOCYTES # BLD AUTO: 2.16 K/UL
LYMPHOCYTES NFR BLD AUTO: 46.7 %
MAN DIFF?: NORMAL
MCHC RBC-ENTMCNC: 30.2 PG
MCHC RBC-ENTMCNC: 32.6 GM/DL
MCV RBC AUTO: 92.7 FL
MONOCYTES # BLD AUTO: 0.38 K/UL
MONOCYTES NFR BLD AUTO: 8.2 %
NEUTROPHILS # BLD AUTO: 1.99 K/UL
NEUTROPHILS NFR BLD AUTO: 43 %
NONHDLC SERPL-MCNC: 95 MG/DL
PLATELET # BLD AUTO: 188 K/UL
POTASSIUM SERPL-SCNC: 4.2 MMOL/L
PROT SERPL-MCNC: 7.1 G/DL
RBC # BLD: 4.24 M/UL
RBC # FLD: 12.9 %
SODIUM SERPL-SCNC: 144 MMOL/L
TRIGL SERPL-MCNC: 73 MG/DL
TSH SERPL-ACNC: 0.35 UIU/ML
WBC # FLD AUTO: 4.63 K/UL

## 2022-10-31 ENCOUNTER — APPOINTMENT (OUTPATIENT)
Dept: MAMMOGRAPHY | Facility: CLINIC | Age: 62
End: 2022-10-31

## 2022-10-31 ENCOUNTER — APPOINTMENT (OUTPATIENT)
Dept: ULTRASOUND IMAGING | Facility: CLINIC | Age: 62
End: 2022-10-31

## 2022-10-31 PROCEDURE — 76641 ULTRASOUND BREAST COMPLETE: CPT | Mod: 50

## 2022-10-31 PROCEDURE — 77067 SCR MAMMO BI INCL CAD: CPT

## 2022-10-31 PROCEDURE — 77063 BREAST TOMOSYNTHESIS BI: CPT

## 2022-11-03 ENCOUNTER — APPOINTMENT (OUTPATIENT)
Dept: RHEUMATOLOGY | Facility: CLINIC | Age: 62
End: 2022-11-03

## 2022-11-04 ENCOUNTER — APPOINTMENT (OUTPATIENT)
Dept: PAIN MANAGEMENT | Facility: CLINIC | Age: 62
End: 2022-11-04

## 2022-11-04 ENCOUNTER — APPOINTMENT (OUTPATIENT)
Dept: ORTHOPEDIC SURGERY | Facility: CLINIC | Age: 62
End: 2022-11-04

## 2022-11-04 VITALS
HEIGHT: 60 IN | DIASTOLIC BLOOD PRESSURE: 76 MMHG | SYSTOLIC BLOOD PRESSURE: 117 MMHG | HEART RATE: 68 BPM | BODY MASS INDEX: 25.52 KG/M2 | WEIGHT: 130 LBS

## 2022-11-04 PROCEDURE — 99213 OFFICE O/P EST LOW 20 MIN: CPT

## 2022-11-04 NOTE — HISTORY OF PRESENT ILLNESS
[FreeTextEntry1] : 63 y/o F with Pmhx right temporal meningioma s/p resection, partial seizures, Psoriatic Arthritis, Lumbar spondylosis, Fibromyalgia with chronic pain who presents today for follow up of chronic pain.  Since her visit, reports she is tolerating MM well without AE as well as the LDN 1 mg 3x/day. Chronic constant pain , primarily in the left shoulder, elbow, hand, hip 6-7/10 on average worse at night. \par \par Denies dysrhythmia or hallucinations.  \par \par Prior meds: Duloxetine, Savella, Amitriptyline, Celebrex, Tramadol \par Current meds include: Gabapentin 600 mg 2x/day, diclofenac 75 mg BID,  Motrin and APAP prn, TALAZ- Psoriatic arthritis, LDN 1 mg 3x/day. \par

## 2022-11-04 NOTE — ASSESSMENT
[Opioids] : Patient was explained in detail about pain control by using opioids. Patient has signed and fully understands our guidelines for medication and drug screening.  Patient understands the side effects of opioids, including, but not limited to, drug tolerance, dependence, potential for addiction. This class of drugs is habit-forming and LIZZIE regulated. The sedative effects of opioids can be potentiated by taking alcohol or any sleeping pills, along with opioids. The decision to drive is patient’s responsibility, as opioids can affect his/her driving ability and ability to concentrate. The long-term place is not clear, however, patient understands that once the pain control optimizes, the goal will be to wean off the opioids. All the issues regarding opioid treatment have been addressed satisfactorily.  [FreeTextEntry1] : 61 y/o F with chronic pain syndrome - multifactorial \par \par - Topical diclofenac \par -  Increase LDN to 4.5 mg daily.  \par -Continue PT \par -UDS July 2022 neg. \par -MM recertification will be provided - and will consider increasing to trial of high thc given lack of response to 1:1\par \par I-Stop reviewed,  reference #: [id number]\par No signs of aberrant behavior and will continue to monitor for signs of toxicity.\par Reminded to continue to avoid alcohol.\par \par Ms. SHAWNA NICK denies any history of psychosis, immediate family history of psychosis or arrhythmia. In my opinion She would benefit from medical marijuana. In regards to ratio, I believe She would benefit from a one-to-one plus a low THC: high CBD as well as trial of high thc . She  has been advised of the potential risks and benefits of this agent. She has also been advised been advised not to drive up to four hours after taking medical marijuana.Patient has signed and fully understands Medical Cannabis Treatment Agreement our guidelines for prescription medication/cannabis and drug screening.  Medical marijuana agreement was reviewed and signed by patient. \par \par \par \par \par

## 2022-11-07 ENCOUNTER — APPOINTMENT (OUTPATIENT)
Dept: ENDOCRINOLOGY | Facility: CLINIC | Age: 62
End: 2022-11-07

## 2022-11-07 VITALS
OXYGEN SATURATION: 99 % | SYSTOLIC BLOOD PRESSURE: 118 MMHG | TEMPERATURE: 97.6 F | BODY MASS INDEX: 25.98 KG/M2 | HEART RATE: 67 BPM | DIASTOLIC BLOOD PRESSURE: 62 MMHG | WEIGHT: 133 LBS

## 2022-11-07 PROCEDURE — 96401 CHEMO ANTI-NEOPL SQ/IM: CPT

## 2022-11-07 PROCEDURE — 99214 OFFICE O/P EST MOD 30 MIN: CPT | Mod: 25

## 2022-11-07 RX ORDER — DENOSUMAB 60 MG/ML
60 INJECTION SUBCUTANEOUS
Qty: 1 | Refills: 0 | Status: COMPLETED | OUTPATIENT
Start: 2022-11-07

## 2022-11-07 RX ADMIN — DENOSUMAB 60 MG/ML: 60 INJECTION SUBCUTANEOUS at 00:00

## 2022-11-15 ENCOUNTER — RX RENEWAL (OUTPATIENT)
Age: 62
End: 2022-11-15

## 2022-11-28 ENCOUNTER — RX RENEWAL (OUTPATIENT)
Age: 62
End: 2022-11-28

## 2022-12-05 ENCOUNTER — APPOINTMENT (OUTPATIENT)
Dept: RHEUMATOLOGY | Facility: CLINIC | Age: 62
End: 2022-12-05

## 2022-12-12 ENCOUNTER — RX RENEWAL (OUTPATIENT)
Age: 62
End: 2022-12-12

## 2022-12-12 DIAGNOSIS — M79.602 PAIN IN LEFT ARM: ICD-10-CM

## 2022-12-12 DIAGNOSIS — M79.601 PAIN IN RIGHT ARM: ICD-10-CM

## 2023-01-07 ENCOUNTER — RX RENEWAL (OUTPATIENT)
Age: 63
End: 2023-01-07

## 2023-02-06 ENCOUNTER — APPOINTMENT (OUTPATIENT)
Dept: RHEUMATOLOGY | Facility: CLINIC | Age: 63
End: 2023-02-06
Payer: COMMERCIAL

## 2023-02-06 VITALS
HEIGHT: 60 IN | TEMPERATURE: 98 F | OXYGEN SATURATION: 98 % | DIASTOLIC BLOOD PRESSURE: 81 MMHG | SYSTOLIC BLOOD PRESSURE: 129 MMHG | HEART RATE: 90 BPM | BODY MASS INDEX: 24.54 KG/M2 | WEIGHT: 125 LBS

## 2023-02-06 DIAGNOSIS — R29.898 OTHER SYMPTOMS AND SIGNS INVOLVING THE MUSCULOSKELETAL SYSTEM: ICD-10-CM

## 2023-02-06 DIAGNOSIS — M79.641 PAIN IN RIGHT HAND: ICD-10-CM

## 2023-02-06 DIAGNOSIS — E78.00 PURE HYPERCHOLESTEROLEMIA, UNSPECIFIED: ICD-10-CM

## 2023-02-06 DIAGNOSIS — M79.642 PAIN IN RIGHT HAND: ICD-10-CM

## 2023-02-06 PROCEDURE — 99213 OFFICE O/P EST LOW 20 MIN: CPT

## 2023-02-06 RX ORDER — FOLIC ACID 1 MG/1
1 TABLET ORAL DAILY
Qty: 90 | Refills: 2 | Status: DISCONTINUED | COMMUNITY
Start: 2021-03-08 | End: 2023-02-06

## 2023-02-07 LAB
ALBUMIN SERPL ELPH-MCNC: 4.9 G/DL
ALP BLD-CCNC: 55 U/L
ALT SERPL-CCNC: 17 U/L
ANION GAP SERPL CALC-SCNC: 10 MMOL/L
AST SERPL-CCNC: 16 U/L
BASOPHILS # BLD AUTO: 0.04 K/UL
BASOPHILS NFR BLD AUTO: 0.9 %
BILIRUB SERPL-MCNC: 0.3 MG/DL
BUN SERPL-MCNC: 13 MG/DL
CALCIUM SERPL-MCNC: 9.5 MG/DL
CHLORIDE SERPL-SCNC: 108 MMOL/L
CHOLEST SERPL-MCNC: 177 MG/DL
CO2 SERPL-SCNC: 24 MMOL/L
CREAT SERPL-MCNC: 0.7 MG/DL
CRP SERPL-MCNC: <3 MG/L
EGFR: 99 ML/MIN/1.73M2
EOSINOPHIL # BLD AUTO: 0.03 K/UL
EOSINOPHIL NFR BLD AUTO: 0.7 %
ERYTHROCYTE [SEDIMENTATION RATE] IN BLOOD BY WESTERGREN METHOD: 2 MM/HR
GLUCOSE SERPL-MCNC: 93 MG/DL
HBV CORE IGG+IGM SER QL: NONREACTIVE
HBV SURFACE AB SER QL: NONREACTIVE
HBV SURFACE AG SER QL: NONREACTIVE
HCT VFR BLD CALC: 41.8 %
HCV AB SER QL: NONREACTIVE
HCV S/CO RATIO: 0.11 S/CO
HDLC SERPL-MCNC: 63 MG/DL
HGB BLD-MCNC: 13.5 G/DL
IMM GRANULOCYTES NFR BLD AUTO: 0 %
LDLC SERPL CALC-MCNC: 97 MG/DL
LYMPHOCYTES # BLD AUTO: 1.65 K/UL
LYMPHOCYTES NFR BLD AUTO: 38.7 %
MAN DIFF?: NORMAL
MCHC RBC-ENTMCNC: 30.3 PG
MCHC RBC-ENTMCNC: 32.3 GM/DL
MCV RBC AUTO: 93.7 FL
MONOCYTES # BLD AUTO: 0.28 K/UL
MONOCYTES NFR BLD AUTO: 6.6 %
NEUTROPHILS # BLD AUTO: 2.26 K/UL
NEUTROPHILS NFR BLD AUTO: 53.1 %
NONHDLC SERPL-MCNC: 114 MG/DL
PLATELET # BLD AUTO: 180 K/UL
POTASSIUM SERPL-SCNC: 4.6 MMOL/L
PROT SERPL-MCNC: 7.2 G/DL
RBC # BLD: 4.46 M/UL
RBC # FLD: 12.6 %
SODIUM SERPL-SCNC: 142 MMOL/L
TRIGL SERPL-MCNC: 84 MG/DL
TSH SERPL-ACNC: 0.3 UIU/ML
WBC # FLD AUTO: 4.26 K/UL

## 2023-02-07 NOTE — ASSESSMENT
[FreeTextEntry1] : today has multiple complaints, similar to prior visits with pain all over and hand pain\par \par lost weight over the last few months\par 5 lbs admits to poor PO intake recently during the time she \par was treated for flu and cold/URI x 3 over the last few months \par PNA\par the flare started during this time but prior she was feeling well\par no rash \par Sees me, ortho Spine, PMR, ortho hand, neuro Pain \par 1) recent flare 2/2 infection and made pt aware i believe this is reason for her complaints, however she does ave \par a tendency to have 2ndary drug failure\par 2) multiple recent infections - suggested d/c arava but she believes strongly this is the Rx that treats her peripheral joint sx most \par 3) labs ordered to monitor disease activity and for medication side effects.  Will notify of results\par continue Rx for now \par fuv w/ PMR, ortho, pain\par pt advised to see PMD if weight loss continues \par patient is aware of and in agreement with assessment and plans\par fuv 2 mo

## 2023-02-07 NOTE — PHYSICAL EXAM
[General Appearance - In No Acute Distress] : in no acute distress [Nail Clubbing] : no clubbing  or cyanosis of the fingernails [Musculoskeletal - Swelling] : no joint swelling seen [Motor Tone] : muscle strength and tone were normal [FreeTextEntry1] : no  swollen joints  4 tender points    TTP base of bilat thumb and wrist  [] : no rash [No Focal Deficits] : no focal deficits

## 2023-02-07 NOTE — HISTORY OF PRESENT ILLNESS
[FreeTextEntry1] : requests new Rx for gabapentin 1200mg TID\par Today L elbow pain\par total body pain\par no rash\par \par \par \par FMS, chronic back and hip pain, ps/psa/sacroiliitis\par gets rash from SQ MTX and cannot tolerate PO\par on cosentyx now since 2022 - having digital and R wrist, episode of L 4th toe dactlyitis\par since our last visit has seen ENDO for Osteoporosis, PMR, Pain and ortho spine \par severe L hip and back pain DDD \par \par \par \par was given Rx for cosentyx in August 2020 but had to stop the medicine after 2 wks due to having brain surgery. Rx dose initially was 150mg last visit increased to 300mg dose which she is now tolerating \par Years ago when i met her based on SI tenderness, inflammatory back pain and +[id number] pt was given sx of sacroiliits/SpA\par she has hx of scalp ps as well - today we discuss SPA/PSA in detail though today she says she doesn't have psoriasis presently.\par last visit reported SEVERE HAND AND ELBOW PAIN. THIS IS ACCOMPANIED BY AM STIFFNESS IN PIP, MCP\par R HAND WORSE THAN L\par was given SQ MTX given these peripheral sx c/w PSA but she has not tolerated it \par \par also has FMS and chronic Back pain with DDD, DJD\par had R TKR at [facility] 2019 has persistent knee pain\par today reports medial elbow pain bilateral, plantar pain\par she takes gabapentin for FMS - hasn't tolerated several other Rx\par \par she is in the care of ORTHO, Pain Management \par She doesn't tolerate PO meds well\par after 2ndary failure on humira changed to cimzia then cosentyx\par \par recently started PT but w/o noticeable change yet\par

## 2023-02-11 ENCOUNTER — RX RENEWAL (OUTPATIENT)
Age: 63
End: 2023-02-11

## 2023-02-12 ENCOUNTER — RX RENEWAL (OUTPATIENT)
Age: 63
End: 2023-02-12

## 2023-02-12 RX ORDER — DICLOFENAC SODIUM 75 MG/1
75 TABLET, DELAYED RELEASE ORAL
Qty: 60 | Refills: 5 | Status: ACTIVE | COMMUNITY
Start: 2021-01-25 | End: 1900-01-01

## 2023-02-21 LAB
M TB IFN-G BLD-IMP: NEGATIVE
QUANTIFERON TB PLUS MITOGEN MINUS NIL: 9.56 IU/ML
QUANTIFERON TB PLUS NIL: 0.02 IU/ML
QUANTIFERON TB PLUS TB1 MINUS NIL: 0.01 IU/ML
QUANTIFERON TB PLUS TB2 MINUS NIL: 0 IU/ML

## 2023-03-08 ENCOUNTER — APPOINTMENT (OUTPATIENT)
Dept: OTOLARYNGOLOGY | Facility: CLINIC | Age: 63
End: 2023-03-08

## 2023-03-11 ENCOUNTER — RX RENEWAL (OUTPATIENT)
Age: 63
End: 2023-03-11

## 2023-03-29 ENCOUNTER — RX RENEWAL (OUTPATIENT)
Age: 63
End: 2023-03-29

## 2023-04-10 ENCOUNTER — RX RENEWAL (OUTPATIENT)
Age: 63
End: 2023-04-10

## 2023-05-07 ENCOUNTER — RX RENEWAL (OUTPATIENT)
Age: 63
End: 2023-05-07

## 2023-05-17 ENCOUNTER — APPOINTMENT (OUTPATIENT)
Dept: ENDOCRINOLOGY | Facility: CLINIC | Age: 63
End: 2023-05-17
Payer: COMMERCIAL

## 2023-05-17 VITALS
HEART RATE: 76 BPM | OXYGEN SATURATION: 98 % | HEIGHT: 60.5 IN | DIASTOLIC BLOOD PRESSURE: 78 MMHG | SYSTOLIC BLOOD PRESSURE: 130 MMHG | BODY MASS INDEX: 24.67 KG/M2 | WEIGHT: 129 LBS

## 2023-05-17 DIAGNOSIS — E04.2 NONTOXIC MULTINODULAR GOITER: ICD-10-CM

## 2023-05-17 PROCEDURE — ZZZZZ: CPT

## 2023-05-17 PROCEDURE — 77080 DXA BONE DENSITY AXIAL: CPT

## 2023-05-17 PROCEDURE — 99214 OFFICE O/P EST MOD 30 MIN: CPT | Mod: 25

## 2023-05-18 PROBLEM — E04.2 MULTINODULAR NON-TOXIC GOITER: Status: ACTIVE | Noted: 2022-11-08

## 2023-05-18 RX ORDER — DICLOFENAC SODIUM 10 MG/G
1 GEL TOPICAL
Refills: 0 | Status: DISCONTINUED | COMMUNITY
End: 2023-05-18

## 2023-05-18 RX ORDER — ECONAZOLE NITRATE 10 MG/G
1 CREAM TOPICAL
Qty: 15 | Refills: 0 | Status: DISCONTINUED | COMMUNITY
Start: 2022-06-14 | End: 2023-05-18

## 2023-05-18 RX ORDER — METHENAMINE, SODIUM PHOSPHATE, MONOBASIC, MONOHYDRATE, PHENYL SALICYLATE, METHYLENE BLUE, AND HYOSCYAMINE SULFATE 118; 40.8; 36; 10; .12 MG/1; MG/1; MG/1; MG/1; MG/1
118 CAPSULE ORAL
Qty: 80 | Refills: 0 | Status: DISCONTINUED | COMMUNITY
Start: 2022-02-05 | End: 2023-05-18

## 2023-05-18 RX ORDER — ESTRADIOL 0.1 MG/G
0.1 CREAM VAGINAL
Qty: 42 | Refills: 0 | Status: DISCONTINUED | COMMUNITY
Start: 2022-04-26 | End: 2023-05-18

## 2023-05-18 NOTE — ASSESSMENT
[Denosumab Therapy] : Risks  and benefits of denosumab therapy were discussed with the patient including eczema, cellulitis, osteonecrosis of the jaw and atypical femur fractures [FreeTextEntry1] : 62 year old female with osteoporosis. \par \par Pt has known of low bone density since ~ 2013. She was previously intolerant to oral bisphosphonates due to UGI sx. BMD 2017 decreased while on drug holiday. Pt then had 2 doses of Prolia (last ~6/2018). She did not tolerate due to severe myalgias. Pt has previously been on long term steroids and restarted Prednisone ~2/2019. BMD 2/2019 indicates improvement in spine and tot hip, only fem neck consistent with osteoporosis (likely due to Prolia). Pt had first dose of IV Reclast 3/2019. Tolerated well. BMD 2/2020 indicates stable osteopenia in spine, total hip, and proximal radius; stable osteoporosis in femoral neck. Pt had another dose of IV Reclast 3/2021. Tolerated well. No thigh pain, no interval fx, no APR. No ONJ.\par BMD 2022 decreased restarted Prolia 4/22\par Patient doing well since and tolerated prolia last injection. No interval falls or fractures. \par BMD 2023 increased spine stabilized hip \par Pt planning tooth extraction tomorrow. Recommend delay Prolia for 4-6 week. \par \par Prolia from [facility] hx is notable for thyroid nodules, previously benign on biopsy. No local neck pain. No dysphagia or dysphonia.\par Exam palp left nodule. \par  Thyroid US today  notable for bilateral nodules, appears generally stable vs prior. Pt clinically euthyroid, asympt. Option of elective surgery or RFA reviewed. Pt declines. Observe.\par

## 2023-05-18 NOTE — PROCEDURE
[FreeTextEntry1] : Thyroid US 5/17/23\par left thyroid nodule 20  x  17  x 36 mm \par Right thyroid nodule partially cystic approximately 15 X 16  X 17 mm\par \par Bone mineral density: 05/17/2023\par indication: vs 2022 assess response to medication prior study showed rapid bone loss \par spine -2.0 osteopenia +8.7%\par total hip -2.1 osteopenia, no significant change \par femoral neck  -2.8 osteoporosis, no significant change \par proximal radius -1.5 osteopenia, no significant change \par \par Thyroid US 11/2022\par left thyroid nodule 1.5 x  1.9 x 3.6cm  \par Right thyroid nodule approximately 16 X 15 X 19 mm\par \par Bone mineral density: 04/06/2022 \par Indication: vs. 2020\par Spine: (L1-L3) -2.5 osteoporosis, -5.8%\par Total hip: -2.3 osteopenia, no significant change\par Femoral neck: -2.9 osteoporosis, -5.4%\par Proximal radius: -1.7 osteopenia, no significant change\par \par Thyroid ultrasound March 3, 2021\par Right thyroid nodule: \par 15 x 15 x 20 mm nodule no prior for comparison\par \par Bone mineral density: 02/04/2020 \par Indication: vs. 2019\par Spine: -2.2 osteopenia, no significant change\par Total hip: -2.2 osteopenia, no significant change\par Femoral neck: -2.6 osteoporosis, no significant change\par Proximal radius: -1.4 osteopenia, no significant change\par \par Bone mineral density: 02/21/2019 \par Indication: vs. 2017 \par Spine: -2.2 osteopenia (+8.0%)\par Total hip: -2.3 osteopenia (+4.9%)\par Femoral neck: -2.6 osteoporosis, no significant change \par Proximal radius: -1.3 osteopenia, no significant change \par

## 2023-05-18 NOTE — HISTORY OF PRESENT ILLNESS
[Calcium (dietary)] : dietary Calcium [Denosumab (Prolia)] : Denosumab [Vitamin D (oral)] : Vitamin D orally [Zoledronic Acid (Zometa)] : Zoledronic Acid [Low Calcium Intake] : no low calcium intake [Premat. Menopause (surgery)] : a history of premature surgical menopause [Family History of Osteoporosis] : family history of osteoporosis [FreeTextEntry1] : No significant interval health changes. No interval surgery, hospitalizations, fractures, or change in medications.\par \par Pt was first told of low bone density ~2013. Pt was being treated by PCP. She previously took an oral bisphosphonates and had severe UGI sx. She had endoscopies, no ulcers found. BMD 11/2017 vs 2014, spine: -2.8, osteoporosis ( -11.4%) Total hip: -2.5 osteoporosis (-9.6%) Fem neck: -2.8 osteoporosis (-6.8%) Proximal wrist: -1.1 osteopenia (-3.4%). Pt was then put on Prolia for 2 doses (last ~6/2018). She states on Prolia she has severe myalgias. No h/o fx. FHx of osteoporosis (mother and sister). Pt had hysterectomy at 42 due to fibroids and went through menopause ~ 52. No HRT. No hot flashes. She gets moderate amounts of dietary Ca and takes Ca. BMD 2/2019 indicates improvement in spine and tot hip, only fem neck consistent with osteoporosis (likely due to Prolia). Pt had first dose of IV Reclast 3/2019. Tolerated well. BMD 2/2020 indicates stable osteopenia in spine, total hip, and proximal radius; stable osteoporosis in femoral neck. Pt had another dose of IV Reclast 3/2021. Tolerated well. No thigh pain, no interval fx, no APR.\par Dental: planning tooth extraction R lower molar this week \par \par Pt has fibromyalgia; previously on long term Prednisone for 6 mons in 2018.\par \par Pt had RKR 6/2019. No complications. Pt still has some discomfort from procedure. Pt still ambulates w/ cane.\par \par Endocrine hx notable for R thyroid nodule, FNA biopsy benign with Dr. Shahriar Strauss. No dysphagia or neck pain. No sx of hyper or hypothyroidism. \par \par Patient previously stated that she went through a surgery for meningioma, the procedure went well w/o any complications.\par \par 11/7/2022 Visit\par - patient received for injection of prolia 4/22, tolerated well\par - denies any interval fractures or falls\par - she reports occasionally taking calcium supplementation, denies taking vitamin D\par - she reports when she doesn't take calcium, her nails break more easily\par - endorses left hand tingling, left calf weakness\par - no milk or leafy greens in diet\par - no dental procedures coming up, reports needs a  one of her teeth 11/17/22, last seen dentist 3 months ago, no issues\par - reports right TMJ joint pain for over 1 year, can be bad or good\par - has a history of thyroid nodule, denies neck pain, dysphagia, dysphonia\par - not currently on steroids\par - does not smoke cigarettes

## 2023-05-18 NOTE — REVIEW OF SYSTEMS
[Constipation] : constipation [Myalgia] : myalgia  [Dry Skin] : dry skin [Hair Loss] : hair loss [Pain/Numbness of Digits] : pain/numbness of digits [Fatigue] : no fatigue [Decreased Appetite] : appetite not decreased [Recent Weight Gain (___ Lbs)] : no recent weight gain [Recent Weight Loss (___ Lbs)] : no recent weight loss [Fever] : no fever [Chills] : no chills [Blurred Vision] : no blurred vision [Dysphagia] : no dysphagia [Neck Pain] : no neck pain [Dysphonia] : no dysphonia [Chest Pain] : no chest pain [Palpitations] : no palpitations [Lower Ext Edema] : no lower extremity edema [Shortness Of Breath] : no shortness of breath [Cough] : no cough [Nausea] : no nausea [Abdominal Pain] : no abdominal pain [Vomiting] : no vomiting [Diarrhea] : no diarrhea [Headaches] : no headaches [Tremors] : no tremors [Cold Intolerance] : no cold intolerance [Heat Intolerance] : no heat intolerance

## 2023-05-18 NOTE — PHYSICAL EXAM
[Alert] : alert [Healthy Appearance] : healthy appearance [No Acute Distress] : no acute distress [Well Developed] : well developed [Normal Sclera/Conjunctiva] : normal sclera/conjunctiva [No Proptosis] : no proptosis [No Neck Mass] : no neck mass was observed [No Respiratory Distress] : no respiratory distress [Clear to Auscultation] : lungs were clear to auscultation bilaterally [Normal Bowel Sounds] : normal bowel sounds [Not Tender] : non-tender [Soft] : abdomen soft [No Stigmata of Cushings Syndrome] : no stigmata of Cushings Syndrome [No Involuntary Movements] : no involuntary movements were seen [No Tremors] : no tremors [Normal Affect] : the affect was normal [Normal Mood] : the mood was normal [Abdominal Striae] : no abdominal striae [Acanthosis Nigricans] : no acanthosis nigricans [Acne] : no acne [de-identified] : left lobe nodule

## 2023-05-22 ENCOUNTER — RX RENEWAL (OUTPATIENT)
Age: 63
End: 2023-05-22

## 2023-06-19 ENCOUNTER — APPOINTMENT (OUTPATIENT)
Dept: RHEUMATOLOGY | Facility: CLINIC | Age: 63
End: 2023-06-19
Payer: COMMERCIAL

## 2023-06-19 ENCOUNTER — APPOINTMENT (OUTPATIENT)
Dept: ENDOCRINOLOGY | Facility: CLINIC | Age: 63
End: 2023-06-19
Payer: COMMERCIAL

## 2023-06-19 VITALS
OXYGEN SATURATION: 98 % | TEMPERATURE: 98.2 F | DIASTOLIC BLOOD PRESSURE: 76 MMHG | SYSTOLIC BLOOD PRESSURE: 116 MMHG | WEIGHT: 125 LBS | HEART RATE: 79 BPM | HEIGHT: 60.5 IN | BODY MASS INDEX: 23.91 KG/M2

## 2023-06-19 DIAGNOSIS — M50.30 OTHER CERVICAL DISC DEGENERATION, UNSPECIFIED CERVICAL REGION: ICD-10-CM

## 2023-06-19 DIAGNOSIS — M77.01 MEDIAL EPICONDYLITIS, RIGHT ELBOW: ICD-10-CM

## 2023-06-19 DIAGNOSIS — Z00.00 ENCOUNTER FOR GENERAL ADULT MEDICAL EXAMINATION W/OUT ABNORMAL FINDINGS: ICD-10-CM

## 2023-06-19 DIAGNOSIS — M79.7 FIBROMYALGIA: ICD-10-CM

## 2023-06-19 PROCEDURE — 96401 CHEMO ANTI-NEOPL SQ/IM: CPT

## 2023-06-19 PROCEDURE — 99214 OFFICE O/P EST MOD 30 MIN: CPT

## 2023-06-19 RX ORDER — DENOSUMAB 60 MG/ML
60 INJECTION SUBCUTANEOUS
Qty: 1 | Refills: 0 | Status: COMPLETED | OUTPATIENT
Start: 2023-06-16

## 2023-06-20 LAB
ALBUMIN SERPL ELPH-MCNC: 4.8 G/DL
ALP BLD-CCNC: 53 U/L
ALT SERPL-CCNC: 15 U/L
ANION GAP SERPL CALC-SCNC: 10 MMOL/L
AST SERPL-CCNC: 19 U/L
BILIRUB SERPL-MCNC: 0.3 MG/DL
BUN SERPL-MCNC: 17 MG/DL
CALCIUM SERPL-MCNC: 9.6 MG/DL
CHLORIDE SERPL-SCNC: 108 MMOL/L
CHOLEST SERPL-MCNC: 140 MG/DL
CO2 SERPL-SCNC: 24 MMOL/L
CREAT SERPL-MCNC: 0.77 MG/DL
CRP SERPL-MCNC: <3 MG/L
EGFR: 88 ML/MIN/1.73M2
ERYTHROCYTE [SEDIMENTATION RATE] IN BLOOD BY WESTERGREN METHOD: < 2 MM/HR
HDLC SERPL-MCNC: 63 MG/DL
LDLC SERPL CALC-MCNC: 56 MG/DL
NONHDLC SERPL-MCNC: 77 MG/DL
POTASSIUM SERPL-SCNC: 4.3 MMOL/L
PROT SERPL-MCNC: 6.8 G/DL
SODIUM SERPL-SCNC: 143 MMOL/L
TRIGL SERPL-MCNC: 106 MG/DL
TSH SERPL-ACNC: 0.58 UIU/ML

## 2023-06-20 NOTE — ASSESSMENT
[FreeTextEntry1] : 62 yof w/ sacroiliitis, scalp ps, FMS, chronic pain\par today w/o sx of inflammation, reports ongoing severe back spasm, chronic pain\par \par due for fuv w/ pain management though pt reports she thought she was not due for one year\par that MD is prescribing several medications and she was, as per notes, advised to come for fuv \par \par global pain syndrome x 1-2 weeks \par no evidence today ps or inflammatory component to her complaints \par pt believes this combination Rx taltz/arava does help\par she asks to inc dose of diclofenac which is at max, and gabapentin but she is advised to see PMR/Neuro/Pain for this \par trial of low dose steroid taper though inflamm is not the overriding issue\par patient counseled on risks/benefits/potential SE of medication\par hold diclofenac during this time of steroid use\par fuv 3 mo\par \par \par labs ordered to monitor disease activity and for medication side effects.  Will notify of results\par patient is aware of and in agreement with assessment and plans\par

## 2023-06-20 NOTE — HISTORY OF PRESENT ILLNESS
[FreeTextEntry1] : 62 yof w/ sacroiliitis, scalp ps, FMS, chronic pain\par today w/o sx of inflammation, reports ongoing severe back spasm, chronic pain\par \par due for fuv w/ pain management though pt reports she thought she was not due for one year\par that MD is prescribing several medications and she was, as per notes, advised to come for fuv \par \par global pain syndrome x 1-2 weeks \par taking diclofenac, gabapenting 600mg TID, low dose naltrexone\par taltz, arava for SpA/PSA\par \par HLAB27+ sacroiliitis, radiographic SpA \par \par this visit she reports pain all over since last week\par had been improving \par no obvious trigger \par no change in meds\par at last visit reported to me recurrent infections so I thought she was going to stop arava: did not \par \par  \par \par She is also seeing Dr. Salas for thumb triggering and ulnar n. compression \par Offered surgery but patient has thus far declined \par Going to PT now\par \par several prior Rx for SpA in the past \par failed to tolerate many meds for FMS and chronic pain \par  [AM Back stiffness] : AM back stiffness [Back pain that wakes patient from sleep] : back pain that wakes patient from sleep [Joint Pain] : joint pain [Joint stiffness] : joint stiffness [Joint Swelling] : no joint swelling [Urethritis] : no urethritis [Eye Pain] : no eye pain [Eye redness] : no eye redness [Dactylitis] : no dactylitis [Diarrhea] : no diarrhea [Rash] : no rash

## 2023-06-20 NOTE — PHYSICAL EXAM
[General Appearance - Alert] : alert [Nail Clubbing] : no clubbing  or cyanosis of the fingernails [Musculoskeletal - Swelling] : no joint swelling seen [Motor Tone] : muscle strength and tone were normal [FreeTextEntry1] : no  swollen joints  8 tender points      [] : no rash [No Focal Deficits] : no focal deficits [Affect] : the affect was normal

## 2023-07-25 ENCOUNTER — APPOINTMENT (OUTPATIENT)
Dept: OBGYN | Facility: CLINIC | Age: 63
End: 2023-07-25
Payer: COMMERCIAL

## 2023-07-25 PROCEDURE — 99386 PREV VISIT NEW AGE 40-64: CPT

## 2023-08-01 ENCOUNTER — APPOINTMENT (OUTPATIENT)
Dept: ULTRASOUND IMAGING | Facility: IMAGING CENTER | Age: 63
End: 2023-08-01

## 2023-08-01 ENCOUNTER — OUTPATIENT (OUTPATIENT)
Dept: OUTPATIENT SERVICES | Facility: HOSPITAL | Age: 63
LOS: 1 days | End: 2023-08-01
Payer: COMMERCIAL

## 2023-08-01 ENCOUNTER — APPOINTMENT (OUTPATIENT)
Dept: MAMMOGRAPHY | Facility: IMAGING CENTER | Age: 63
End: 2023-08-01
Payer: COMMERCIAL

## 2023-08-01 DIAGNOSIS — K46.9 UNSPECIFIED ABDOMINAL HERNIA WITHOUT OBSTRUCTION OR GANGRENE: Chronic | ICD-10-CM

## 2023-08-01 DIAGNOSIS — Z00.8 ENCOUNTER FOR OTHER GENERAL EXAMINATION: ICD-10-CM

## 2023-08-01 DIAGNOSIS — Z12.31 ENCOUNTER FOR SCREENING MAMMOGRAM FOR MALIGNANT NEOPLASM OF BREAST: ICD-10-CM

## 2023-08-01 DIAGNOSIS — N64.4 MASTODYNIA: ICD-10-CM

## 2023-08-01 DIAGNOSIS — R92.8 OTHER ABNORMAL AND INCONCLUSIVE FINDINGS ON DIAGNOSTIC IMAGING OF BREAST: ICD-10-CM

## 2023-08-01 PROCEDURE — G0279: CPT | Mod: 26

## 2023-08-01 PROCEDURE — 77065 DX MAMMO INCL CAD UNI: CPT

## 2023-08-01 PROCEDURE — 77065 DX MAMMO INCL CAD UNI: CPT | Mod: 26,RT

## 2023-08-01 PROCEDURE — 76641 ULTRASOUND BREAST COMPLETE: CPT | Mod: 26,RT

## 2023-08-01 PROCEDURE — G0279: CPT

## 2023-08-01 PROCEDURE — 76641 ULTRASOUND BREAST COMPLETE: CPT

## 2023-08-02 ENCOUNTER — RX RENEWAL (OUTPATIENT)
Age: 63
End: 2023-08-02

## 2023-08-17 ENCOUNTER — APPOINTMENT (OUTPATIENT)
Dept: OBGYN | Facility: CLINIC | Age: 63
End: 2023-08-17

## 2023-09-05 ENCOUNTER — APPOINTMENT (OUTPATIENT)
Dept: PAIN MANAGEMENT | Facility: CLINIC | Age: 63
End: 2023-09-05
Payer: COMMERCIAL

## 2023-09-05 ENCOUNTER — RX RENEWAL (OUTPATIENT)
Age: 63
End: 2023-09-05

## 2023-09-05 ENCOUNTER — NON-APPOINTMENT (OUTPATIENT)
Age: 63
End: 2023-09-05

## 2023-09-05 VITALS
WEIGHT: 125 LBS | DIASTOLIC BLOOD PRESSURE: 78 MMHG | BODY MASS INDEX: 23.91 KG/M2 | HEART RATE: 91 BPM | HEIGHT: 60.5 IN | SYSTOLIC BLOOD PRESSURE: 122 MMHG

## 2023-09-05 PROCEDURE — 99214 OFFICE O/P EST MOD 30 MIN: CPT

## 2023-09-05 RX ORDER — DICLOFENAC SODIUM 1% 10 MG/G
1 GEL TOPICAL
Qty: 100 | Refills: 5 | Status: ACTIVE | COMMUNITY
Start: 2022-07-13 | End: 1900-01-01

## 2023-09-05 RX ORDER — TIZANIDINE 2 MG/1
2 TABLET ORAL
Qty: 60 | Refills: 3 | Status: ACTIVE | COMMUNITY
Start: 2023-09-05 | End: 1900-01-01

## 2023-10-02 ENCOUNTER — APPOINTMENT (OUTPATIENT)
Dept: RHEUMATOLOGY | Facility: CLINIC | Age: 63
End: 2023-10-02
Payer: COMMERCIAL

## 2023-10-02 ENCOUNTER — RX RENEWAL (OUTPATIENT)
Age: 63
End: 2023-10-02

## 2023-10-02 VITALS
SYSTOLIC BLOOD PRESSURE: 119 MMHG | TEMPERATURE: 97.1 F | RESPIRATION RATE: 16 BRPM | WEIGHT: 124 LBS | HEIGHT: 60.5 IN | OXYGEN SATURATION: 98 % | HEART RATE: 81 BPM | DIASTOLIC BLOOD PRESSURE: 83 MMHG | BODY MASS INDEX: 23.72 KG/M2

## 2023-10-02 DIAGNOSIS — R30.0 DYSURIA: ICD-10-CM

## 2023-10-02 DIAGNOSIS — Z00.00 ENCOUNTER FOR GENERAL ADULT MEDICAL EXAMINATION W/OUT ABNORMAL FINDINGS: ICD-10-CM

## 2023-10-02 PROCEDURE — 99214 OFFICE O/P EST MOD 30 MIN: CPT

## 2023-10-02 RX ORDER — ROSUVASTATIN CALCIUM 20 MG/1
20 TABLET, FILM COATED ORAL
Refills: 0 | Status: ACTIVE | COMMUNITY

## 2023-10-02 RX ORDER — EVOLOCUMAB 140 MG/ML
140 INJECTION, SOLUTION SUBCUTANEOUS
Refills: 0 | Status: ACTIVE | COMMUNITY

## 2023-10-03 RX ORDER — GABAPENTIN 600 MG/1
600 TABLET, COATED ORAL
Qty: 360 | Refills: 0 | Status: DISCONTINUED | COMMUNITY
Start: 2021-05-10 | End: 2023-10-03

## 2023-10-04 LAB
ALBUMIN SERPL ELPH-MCNC: 5.1 G/DL
ALP BLD-CCNC: 55 U/L
ALT SERPL-CCNC: 14 U/L
ANION GAP SERPL CALC-SCNC: 11 MMOL/L
APPEARANCE: CLEAR
AST SERPL-CCNC: 17 U/L
BACTERIA UR CULT: NORMAL
BACTERIA: NEGATIVE /HPF
BASOPHILS # BLD AUTO: 0.04 K/UL
BASOPHILS NFR BLD AUTO: 0.8 %
BILIRUB SERPL-MCNC: 0.4 MG/DL
BILIRUBIN URINE: NEGATIVE
BLOOD URINE: ABNORMAL
BUN SERPL-MCNC: 17 MG/DL
CALCIUM OXALATE CRYSTALS: PRESENT
CALCIUM SERPL-MCNC: 9.6 MG/DL
CAST: NORMAL /LPF
CHLORIDE SERPL-SCNC: 106 MMOL/L
CHOLEST SERPL-MCNC: 128 MG/DL
CO2 SERPL-SCNC: 26 MMOL/L
COLOR: NORMAL
CREAT SERPL-MCNC: 0.68 MG/DL
CRP SERPL-MCNC: <3 MG/L
EGFR: 99 ML/MIN/1.73M2
EOSINOPHIL # BLD AUTO: 0.05 K/UL
EOSINOPHIL NFR BLD AUTO: 0.9 %
EPITHELIAL CELLS: 1 /HPF
ERYTHROCYTE [SEDIMENTATION RATE] IN BLOOD BY WESTERGREN METHOD: < 2 MM/HR
GLUCOSE QUALITATIVE U: NEGATIVE MG/DL
HCT VFR BLD CALC: 41.7 %
HDLC SERPL-MCNC: 69 MG/DL
HGB BLD-MCNC: 13.8 G/DL
IMM GRANULOCYTES NFR BLD AUTO: 0.2 %
KETONES URINE: NEGATIVE MG/DL
LDLC SERPL CALC-MCNC: 41 MG/DL
LEUKOCYTE ESTERASE URINE: NEGATIVE
LYMPHOCYTES # BLD AUTO: 2.12 K/UL
LYMPHOCYTES NFR BLD AUTO: 40 %
MAN DIFF?: NORMAL
MCHC RBC-ENTMCNC: 30.2 PG
MCHC RBC-ENTMCNC: 33.1 GM/DL
MCV RBC AUTO: 91.2 FL
MICROSCOPIC-UA: NORMAL
MONOCYTES # BLD AUTO: 0.41 K/UL
MONOCYTES NFR BLD AUTO: 7.7 %
NEUTROPHILS # BLD AUTO: 2.67 K/UL
NEUTROPHILS NFR BLD AUTO: 50.4 %
NITRITE URINE: NEGATIVE
NONHDLC SERPL-MCNC: 59 MG/DL
PH URINE: 6.5
PLATELET # BLD AUTO: 191 K/UL
POTASSIUM SERPL-SCNC: 4 MMOL/L
PROT SERPL-MCNC: 7.6 G/DL
PROTEIN URINE: NORMAL MG/DL
RBC # BLD: 4.57 M/UL
RBC # FLD: 12.7 %
RED BLOOD CELLS URINE: 21 /HPF
REVIEW: NORMAL
SODIUM SERPL-SCNC: 143 MMOL/L
SPECIFIC GRAVITY URINE: 1.02
TRIGL SERPL-MCNC: 99 MG/DL
TSH SERPL-ACNC: 0.47 UIU/ML
UROBILINOGEN URINE: 0.2 MG/DL
WBC # FLD AUTO: 5.3 K/UL
WHITE BLOOD CELLS URINE: 1 /HPF

## 2023-10-10 ENCOUNTER — TRANSCRIPTION ENCOUNTER (OUTPATIENT)
Age: 63
End: 2023-10-10

## 2023-10-19 DIAGNOSIS — M54.9 DORSALGIA, UNSPECIFIED: ICD-10-CM

## 2023-10-19 DIAGNOSIS — G89.4 CHRONIC PAIN SYNDROME: ICD-10-CM

## 2023-10-19 RX ORDER — PREDNISONE 10 MG/1
10 TABLET ORAL
Qty: 90 | Refills: 0 | Status: DISCONTINUED | COMMUNITY
Start: 2023-06-19 | End: 2023-10-19

## 2023-10-23 ENCOUNTER — APPOINTMENT (OUTPATIENT)
Dept: MAMMOGRAPHY | Facility: CLINIC | Age: 63
End: 2023-10-23

## 2023-10-23 ENCOUNTER — APPOINTMENT (OUTPATIENT)
Dept: ULTRASOUND IMAGING | Facility: CLINIC | Age: 63
End: 2023-10-23

## 2023-10-31 ENCOUNTER — APPOINTMENT (OUTPATIENT)
Dept: MAMMOGRAPHY | Facility: CLINIC | Age: 63
End: 2023-10-31
Payer: COMMERCIAL

## 2023-10-31 ENCOUNTER — APPOINTMENT (OUTPATIENT)
Dept: ULTRASOUND IMAGING | Facility: CLINIC | Age: 63
End: 2023-10-31
Payer: COMMERCIAL

## 2023-10-31 PROCEDURE — 77063 BREAST TOMOSYNTHESIS BI: CPT

## 2023-10-31 PROCEDURE — 77067 SCR MAMMO BI INCL CAD: CPT

## 2023-10-31 PROCEDURE — 76641 ULTRASOUND BREAST COMPLETE: CPT | Mod: 50

## 2023-11-06 ENCOUNTER — APPOINTMENT (OUTPATIENT)
Dept: RHEUMATOLOGY | Facility: CLINIC | Age: 63
End: 2023-11-06

## 2023-11-20 ENCOUNTER — APPOINTMENT (OUTPATIENT)
Dept: RHEUMATOLOGY | Facility: CLINIC | Age: 63
End: 2023-11-20

## 2023-12-05 ENCOUNTER — APPOINTMENT (OUTPATIENT)
Dept: MRI IMAGING | Facility: CLINIC | Age: 63
End: 2023-12-05

## 2023-12-06 ENCOUNTER — APPOINTMENT (OUTPATIENT)
Dept: PAIN MANAGEMENT | Facility: CLINIC | Age: 63
End: 2023-12-06

## 2023-12-20 ENCOUNTER — APPOINTMENT (OUTPATIENT)
Dept: ENDOCRINOLOGY | Facility: CLINIC | Age: 63
End: 2023-12-20
Payer: COMMERCIAL

## 2023-12-20 ENCOUNTER — MED ADMIN CHARGE (OUTPATIENT)
Age: 63
End: 2023-12-20

## 2023-12-20 VITALS
SYSTOLIC BLOOD PRESSURE: 116 MMHG | BODY MASS INDEX: 23.72 KG/M2 | DIASTOLIC BLOOD PRESSURE: 80 MMHG | WEIGHT: 124 LBS | HEART RATE: 78 BPM | OXYGEN SATURATION: 97 % | HEIGHT: 60.5 IN

## 2023-12-20 DIAGNOSIS — E04.1 NONTOXIC SINGLE THYROID NODULE: ICD-10-CM

## 2023-12-20 DIAGNOSIS — M81.0 AGE-RELATED OSTEOPOROSIS W/OUT CURRENT PATHOLOGICAL FRACTURE: ICD-10-CM

## 2023-12-20 PROCEDURE — 99214 OFFICE O/P EST MOD 30 MIN: CPT | Mod: 25

## 2023-12-20 PROCEDURE — 96401 CHEMO ANTI-NEOPL SQ/IM: CPT

## 2023-12-20 RX ORDER — NALTREXONE HCL 100 %
POWDER (GRAM) MISCELLANEOUS
Qty: 30 | Refills: 5 | Status: DISCONTINUED | COMMUNITY
Start: 2022-11-04 | End: 2023-12-20

## 2023-12-20 RX ORDER — DENOSUMAB 60 MG/ML
60 INJECTION SUBCUTANEOUS
Qty: 1 | Refills: 0 | Status: COMPLETED | OUTPATIENT
Start: 2023-12-20

## 2023-12-20 RX ADMIN — DENOSUMAB 60 MG/ML: 60 INJECTION SUBCUTANEOUS at 00:00

## 2023-12-21 NOTE — HISTORY OF PRESENT ILLNESS
[Zoledronic Acid (Zometa)] : Zoledronic Acid [Denosumab (Prolia)] : Denosumab [FreeTextEntry1] : No significant interval health changes. No interval surgery, hospitalizations, fractures, new medications or new allergies.  Pt was first told of low bone density ~2013. Pt was being treated by PCP. She previously took an oral bisphosphonates and had severe UGI sx. She had endoscopies, no ulcers found. BMD 11/2017 vs 2014, spine: -2.8, osteoporosis ( -11.4%) Total hip: -2.5 osteoporosis (-9.6%) Fem neck: -2.8 osteoporosis (-6.8%) Proximal wrist: -1.1 osteopenia (-3.4%). Pt was then put on Prolia for 2 doses (last ~6/2018). She states on Prolia she has severe myalgias. No h/o fx. FHx of osteoporosis (mother and sister). Pt had hysterectomy at 42 due to fibroids and went through menopause ~ 52. No HRT. No hot flashes. She gets moderate amounts of dietary Ca and takes Ca. BMD 2/2019 indicates improvement in spine and tot hip, only fem neck consistent with osteoporosis (likely due to Prolia). Pt had first dose of IV Reclast 3/2019. Tolerated well. BMD 2/2020 indicates stable osteopenia in spine, total hip, and proximal radius; stable osteoporosis in femoral neck. Pt had another dose of IV Reclast 3/2021. Tolerated well. No thigh pain, no interval fx, no APR.  BMD 2022 decreased. Pt restarted Prolia 4/2022. Tolerating well. No ONJ or AFF. Ca WNL. BMD 5/2023 shows increased spine and stabilized hip. Pt had tooth extraction R lower molar June 2023, implant Oct 2023. Healed well. Last Prolia June 2023.  Pt has fibromyalgia; previously on long term Prednisone for 6 mons in 2018.  Endocrine hx notable for R thyroid nodule, FNA biopsy benign with Dr. Shahriar Strauss. No dysphagia or neck pain. No sx of hyper or hypothyroidism.   Hx: Pt had RKR 6/2019. No complications. Pt still has some discomfort.   Patient previously stated that she went through a surgery for meningioma, the procedure went well w/o any complications.  - she reports occasionally taking calcium supplementation, denies taking vitamin D.she reports when she doesn't take calcium, her nails break more easily - endorses left hand tingling, left calf weakness - no milk or leafy greens in diet - reports right TMJ joint pain for over 1 year, can be bad or good - does not smoke cigarettes

## 2023-12-21 NOTE — PHYSICAL EXAM
[Alert] : alert [Healthy Appearance] : healthy appearance [No Acute Distress] : no acute distress [Well Developed] : well developed [Normal Sclera/Conjunctiva] : normal sclera/conjunctiva [No Proptosis] : no proptosis [Normal Lips/Gums] : the lips and gums were normal [Normal Oropharynx] : the oropharynx was normal [No Neck Mass] : no neck mass was observed [No Respiratory Distress] : no respiratory distress [Clear to Auscultation] : lungs were clear to auscultation bilaterally [Not Tender] : non-tender [Soft] : abdomen soft [No Stigmata of Cushings Syndrome] : no stigmata of Cushings Syndrome [No Involuntary Movements] : no involuntary movements were seen [No Tremors] : no tremors [Normal Affect] : the affect was normal [Normal Mood] : the mood was normal [Abdominal Striae] : no abdominal striae [Acanthosis Nigricans] : no acanthosis nigricans [Acne] : no acne [de-identified] : left lobe nodule

## 2023-12-21 NOTE — ASSESSMENT
[Denosumab Therapy] : Risks  and benefits of denosumab therapy were discussed with the patient including eczema, cellulitis, osteonecrosis of the jaw and atypical femur fractures [FreeTextEntry1] : 63-year-old female with osteoporosis and thyroid nodule.   Pt has known of low bone density since ~ 2013. She was previously intolerant to oral bisphosphonates due to UGI sx. BMD 2017 decreased while on drug holiday. Pt then had 2 doses of Prolia (last ~6/2018). She did not tolerate due to severe myalgias. Pt has previously been on long term steroids and restarted Prednisone ~2/2019. BMD 2/2019 indicates improvement in spine and tot hip, only fem neck consistent with osteoporosis (likely due to Prolia). Pt had first dose of IV Reclast 3/2019. Tolerated well. BMD 2/2020 indicates stable osteopenia in spine, total hip, and proximal radius; stable osteoporosis in femoral neck. Pt had another dose of IV Reclast 3/2021. Tolerated well. No thigh pain, no interval fx, no APR. No ONJ.  BMD 2022 decreased. Pt restarted Prolia 4/2022. Tolerating well. No ONJ or AFF. Ca WNL. BMD 5/2023 shows increased spine and stabilized hip. Pt had tooth extraction in Apr/May 2023, healed well. Last Prolia June 2023. Prolia from [employer] administered today.   Endocrine hx is notable for thyroid nodules, previously benign on biopsy. No local neck pain. No dysphagia or dysphonia. Exam palp left nodule. Thyroid US 5/2023 notable for bilateral nodules, appears generally stable vs prior. Pt clinically euthyroid, asympt. Option of elective surgery or RFA reviewed. Pt declines. Observe.  F/u in 6 months for Prolia.  Repeat BMD on next visit.

## 2023-12-21 NOTE — PROCEDURE
[FreeTextEntry1] : Thyroid US 5/17/23 left thyroid nodule 20  x  17  x 36 mm  Right thyroid nodule partially cystic approximately 15 X 16  X 17 mm  Bone mineral density: 05/17/2023 indication: vs 2022 assess response to medication prior study showed rapid bone loss  spine -2.0 osteopenia +8.7% total hip -2.1 osteopenia, no significant change  femoral neck  -2.8 osteoporosis, no significant change  proximal radius -1.5 osteopenia, no significant change   Thyroid US 11/2022 left thyroid nodule 1.5 x  1.9 x 3.6cm   Right thyroid nodule approximately 16 X 15 X 19 mm  Bone mineral density: 04/06/2022  Indication: vs. 2020 Spine: (L1-L3) -2.5 osteoporosis, -5.8% Total hip: -2.3 osteopenia, no significant change Femoral neck: -2.9 osteoporosis, -5.4% Proximal radius: -1.7 osteopenia, no significant change  Thyroid ultrasound March 3, 2021 Right thyroid nodule:  15 x 15 x 20 mm nodule no prior for comparison  Bone mineral density: 02/04/2020  Indication: vs. 2019 Spine: -2.2 osteopenia, no significant change Total hip: -2.2 osteopenia, no significant change Femoral neck: -2.6 osteoporosis, no significant change Proximal radius: -1.4 osteopenia, no significant change  Bone mineral density: 02/21/2019  Indication: vs. 2017  Spine: -2.2 osteopenia (+8.0%) Total hip: -2.3 osteopenia (+4.9%) Femoral neck: -2.6 osteoporosis, no significant change  Proximal radius: -1.3 osteopenia, no significant change

## 2024-01-04 ENCOUNTER — APPOINTMENT (OUTPATIENT)
Dept: ORTHOPEDIC SURGERY | Facility: CLINIC | Age: 64
End: 2024-01-04

## 2024-01-29 ENCOUNTER — RX RENEWAL (OUTPATIENT)
Age: 64
End: 2024-01-29

## 2024-01-30 ENCOUNTER — APPOINTMENT (OUTPATIENT)
Dept: ORTHOPEDIC SURGERY | Facility: CLINIC | Age: 64
End: 2024-01-30

## 2024-02-20 ENCOUNTER — APPOINTMENT (OUTPATIENT)
Dept: PAIN MANAGEMENT | Facility: CLINIC | Age: 64
End: 2024-02-20
Payer: COMMERCIAL

## 2024-02-20 VITALS
HEART RATE: 85 BPM | SYSTOLIC BLOOD PRESSURE: 112 MMHG | DIASTOLIC BLOOD PRESSURE: 71 MMHG | HEIGHT: 60 IN | WEIGHT: 117 LBS | BODY MASS INDEX: 22.97 KG/M2

## 2024-02-20 PROCEDURE — G2211 COMPLEX E/M VISIT ADD ON: CPT

## 2024-02-20 PROCEDURE — 99214 OFFICE O/P EST MOD 30 MIN: CPT

## 2024-02-20 NOTE — ASSESSMENT
[Opioids] : Patient was explained in detail about pain control by using opioids. Patient has signed and fully understands our guidelines for medication and drug screening.  Patient understands the side effects of opioids, including, but not limited to, drug tolerance, dependence, potential for addiction. This class of drugs is habit-forming and LIZZIE regulated. The sedative effects of opioids can be potentiated by taking alcohol or any sleeping pills, along with opioids. The decision to drive is patient's responsibility, as opioids can affect his/her driving ability and ability to concentrate. The long-term place is not clear, however, patient understands that once the pain control optimizes, the goal will be to wean off the opioids. All the issues regarding opioid treatment have been addressed satisfactorily.  [FreeTextEntry1] : 64 y/o F with chronic pain syndrome - multifactorial , now with worsening left lumar radiculopathy s/p TPI with Dr. Jarvis at [facility], MRI planned.  Failed conservative management including PT.    -  continue LDN to 6 mg daily.   -trial of tizanidine 2-4 mg q hs prn. Advised of AE  -continue gabapentin 600/600/1200 /day -TPI at some point  - Continue Topical diclofenac  -completed PT  -UDS July 2022 neg. repeated 9/5/2023  -MM recertification will be provided - and will consider increasing to trial of high thc given lack of response to 1:1 I-Stop reviewed,  reference #: [id number] No signs of aberrant behavior and will continue to monitor for signs of toxicity. Reminded to continue to avoid alcohol.  Ms. SHAWNA NICK denies any history of psychosis, immediate family history of psychosis or arrhythmia. In my opinion She would benefit from medical marijuana. In regards to ratio, I believe She would benefit from a one-to-one plus a low THC: high CBD as well as trial of high thc . She  has been advised of the potential risks and benefits of this agent. She has also been advised been advised not to drive up to four hours after taking medical marijuana. Patient has signed and fully understands Medical Cannabis Treatment Agreement our guidelines for prescription medication/cannabis and drug screening.  Medical marijuana agreement was reviewed and signed by patient.

## 2024-02-20 NOTE — DATA REVIEWED
[FreeTextEntry1] : Xray L spine- mild multilevel lumbar degenerative spondylosis with areas of endplate irregularity and ridging. Mild space narrowing most pronounced L3-L4 posteriorly. mild multilevel facet arthrosis

## 2024-02-20 NOTE — HISTORY OF PRESENT ILLNESS
[FreeTextEntry1] : 64 y/o F with Pmhx right temporal meningioma s/p resection, partial seizures, Psoriatic Arthritis, Lumbar spondylosis, Fibromyalgia with chronic pain who presents today for follow up of chronic pain.   Since her last visit reports increase in lower back pain left radiating to posterior thigh to knee associated with spasm, difficulty with mobility. She saw Dr. Jarvis at [facility] who prdered Xray and performed TPI left Lumbar, MRI spine ordered but not done yet.  Some improvement in pain since injections but still constant 3-4/10 in am but significantly worse 7-8/10 later in evening.   Chronic diffuse daily pain worse   in -left shoulder, B/L knees, lower back. + numbness right toes intermittent.   She has been following up regularly with Rheum , tramadol prn - constipation.  Tried tizanidine but caused constipation.  Denies dysrhythmia or hallucinations, denies changes to bowel or bladder.   Prior meds: Duloxetine, Savella, Amitriptyline, Celebrex, Tramadol  Current meds include: Gabapentin 600/600/1200mg, LDN 6mg q hs, diclofenac 75 mg BID, Motrin and APAP prn, TALAZ- Psoriatic arthritis, Tramadol , MM

## 2024-02-26 ENCOUNTER — LABORATORY RESULT (OUTPATIENT)
Age: 64
End: 2024-02-26

## 2024-02-26 ENCOUNTER — APPOINTMENT (OUTPATIENT)
Dept: RHEUMATOLOGY | Facility: CLINIC | Age: 64
End: 2024-02-26
Payer: COMMERCIAL

## 2024-02-26 VITALS
HEIGHT: 60 IN | BODY MASS INDEX: 22.97 KG/M2 | SYSTOLIC BLOOD PRESSURE: 118 MMHG | DIASTOLIC BLOOD PRESSURE: 80 MMHG | HEART RATE: 74 BPM | WEIGHT: 117 LBS | OXYGEN SATURATION: 97 %

## 2024-02-26 DIAGNOSIS — M53.3 SACROCOCCYGEAL DISORDERS, NOT ELSEWHERE CLASSIFIED: ICD-10-CM

## 2024-02-26 DIAGNOSIS — L40.50 ARTHROPATHIC PSORIASIS, UNSPECIFIED: ICD-10-CM

## 2024-02-26 DIAGNOSIS — E03.9 HYPOTHYROIDISM, UNSPECIFIED: ICD-10-CM

## 2024-02-26 PROCEDURE — 99214 OFFICE O/P EST MOD 30 MIN: CPT

## 2024-02-26 NOTE — ASSESSMENT
[FreeTextEntry1] : 63 yof w/ sacroiliitis, scalp ps, FMS, chronic pain today w/o sx of inflammation,  global pain syndrome improved   taking diclofenac, gabapentin 600mg TID, low dose naltrexone taltz, arava for SpA/PSA  HLAB27+ sacroiliitis, radiographic SpA   mild hand pain/dec  - home OT, consider referral for OT and/or Hand if not improved   labs ordered to monitor disease activity and for medication side effects.  Will notify of results patient is aware of and in agreement with assessment and plans

## 2024-02-26 NOTE — PHYSICAL EXAM
[General Appearance - Alert] : alert [General Appearance - In No Acute Distress] : in no acute distress [No Spinal Tenderness] : no spinal tenderness [Nail Clubbing] : no clubbing  or cyanosis of the fingernails [Musculoskeletal - Swelling] : no joint swelling seen [Motor Tone] : muscle strength and tone were normal [FreeTextEntry1] : no  swollen joints  mild TTP 1st L MCP   no synovitis  [] : no rash [No Focal Deficits] : no focal deficits [Affect] : the affect was normal

## 2024-02-26 NOTE — HISTORY OF PRESENT ILLNESS
[FreeTextEntry1] : several other MD visits - reviewed all in chart  [AM Back stiffness] : AM back stiffness [Back pain that wakes patient from sleep] : back pain that wakes patient from sleep [Joint Pain] : joint pain [Joint stiffness] : joint stiffness [Joint Swelling] : no joint swelling [Urethritis] : no urethritis [Eye Pain] : no eye pain [Eye redness] : no eye redness [Dactylitis] : no dactylitis [Diarrhea] : no diarrhea [Rash] : no rash

## 2024-02-28 RX ORDER — TRAMADOL HYDROCHLORIDE AND ACETAMINOPHEN 37.5; 325 MG/1; MG/1
37.5-325 TABLET, FILM COATED ORAL
Qty: 60 | Refills: 0 | Status: ACTIVE | COMMUNITY
Start: 2024-02-28 | End: 1900-01-01

## 2024-03-01 LAB
25(OH)D3 SERPL-MCNC: 51.1 NG/ML
ALBUMIN SERPL ELPH-MCNC: 4.5 G/DL
ALP BLD-CCNC: 49 U/L
ALT SERPL-CCNC: 15 U/L
ANION GAP SERPL CALC-SCNC: 8 MMOL/L
AST SERPL-CCNC: 17 U/L
BASOPHILS # BLD AUTO: 0.04 K/UL
BASOPHILS NFR BLD AUTO: 0.9 %
BILIRUB SERPL-MCNC: 0.4 MG/DL
BUN SERPL-MCNC: 17 MG/DL
CALCIUM SERPL-MCNC: 9.2 MG/DL
CHLORIDE SERPL-SCNC: 107 MMOL/L
CHOLEST SERPL-MCNC: 91 MG/DL
CO2 SERPL-SCNC: 27 MMOL/L
CREAT SERPL-MCNC: 0.72 MG/DL
EGFR: 95 ML/MIN/1.73M2
EOSINOPHIL # BLD AUTO: 0.07 K/UL
EOSINOPHIL NFR BLD AUTO: 1.6 %
ERYTHROCYTE [SEDIMENTATION RATE] IN BLOOD BY WESTERGREN METHOD: < 2 MM/HR
HBV CORE IGG+IGM SER QL: NONREACTIVE
HBV SURFACE AB SER QL: NONREACTIVE
HBV SURFACE AG SER QL: NONREACTIVE
HCT VFR BLD CALC: 40.4 %
HCV AB SER QL: NONREACTIVE
HCV S/CO RATIO: 0.15 S/CO
HDLC SERPL-MCNC: 66 MG/DL
HGB BLD-MCNC: 13.4 G/DL
IMM GRANULOCYTES NFR BLD AUTO: 0.2 %
LDLC SERPL CALC-MCNC: 9 MG/DL
LYMPHOCYTES # BLD AUTO: 1.9 K/UL
LYMPHOCYTES NFR BLD AUTO: 42.9 %
M TB IFN-G BLD-IMP: NEGATIVE
MAN DIFF?: NORMAL
MCHC RBC-ENTMCNC: 30 PG
MCHC RBC-ENTMCNC: 33.2 GM/DL
MCV RBC AUTO: 90.4 FL
MONOCYTES # BLD AUTO: 0.29 K/UL
MONOCYTES NFR BLD AUTO: 6.5 %
NEUTROPHILS # BLD AUTO: 2.12 K/UL
NEUTROPHILS NFR BLD AUTO: 47.9 %
NONHDLC SERPL-MCNC: 24 MG/DL
PLATELET # BLD AUTO: 189 K/UL
POTASSIUM SERPL-SCNC: 4.2 MMOL/L
PROT SERPL-MCNC: 6.9 G/DL
QUANTIFERON TB PLUS MITOGEN MINUS NIL: >10 IU/ML
QUANTIFERON TB PLUS NIL: 0.09 IU/ML
QUANTIFERON TB PLUS TB1 MINUS NIL: 0 IU/ML
QUANTIFERON TB PLUS TB2 MINUS NIL: 0 IU/ML
RBC # BLD: 4.47 M/UL
RBC # FLD: 12.6 %
SODIUM SERPL-SCNC: 142 MMOL/L
TRIGL SERPL-MCNC: 73 MG/DL
TSH SERPL-ACNC: 0.24 UIU/ML
WBC # FLD AUTO: 4.43 K/UL

## 2024-03-12 RX ORDER — IXEKIZUMAB 80 MG/ML
80 INJECTION, SOLUTION SUBCUTANEOUS
Qty: 4 | Refills: 3 | Status: ACTIVE | COMMUNITY
Start: 2022-05-05

## 2024-04-01 ENCOUNTER — APPOINTMENT (OUTPATIENT)
Dept: OTOLARYNGOLOGY | Facility: CLINIC | Age: 64
End: 2024-04-01

## 2024-04-25 ENCOUNTER — APPOINTMENT (OUTPATIENT)
Dept: INTERNAL MEDICINE | Facility: CLINIC | Age: 64
End: 2024-04-25

## 2024-04-25 ENCOUNTER — OUTPATIENT (OUTPATIENT)
Dept: OUTPATIENT SERVICES | Facility: HOSPITAL | Age: 64
LOS: 1 days | End: 2024-04-25

## 2024-04-25 ENCOUNTER — NON-APPOINTMENT (OUTPATIENT)
Age: 64
End: 2024-04-25

## 2024-04-25 DIAGNOSIS — K46.9 UNSPECIFIED ABDOMINAL HERNIA WITHOUT OBSTRUCTION OR GANGRENE: Chronic | ICD-10-CM

## 2024-04-26 RX ORDER — GABAPENTIN 600 MG/1
600 TABLET, COATED ORAL
Qty: 120 | Refills: 3 | Status: ACTIVE | COMMUNITY
Start: 2023-10-19 | End: 1900-01-01

## 2024-05-13 ENCOUNTER — RX RENEWAL (OUTPATIENT)
Age: 64
End: 2024-05-13

## 2024-05-13 RX ORDER — DENOSUMAB 60 MG/ML
60 INJECTION SUBCUTANEOUS
Qty: 1 | Refills: 1 | Status: ACTIVE | COMMUNITY
Start: 2022-04-18 | End: 1900-01-01

## 2024-05-22 ENCOUNTER — APPOINTMENT (OUTPATIENT)
Dept: NEUROLOGY | Facility: CLINIC | Age: 64
End: 2024-05-22

## 2024-05-24 ENCOUNTER — RX RENEWAL (OUTPATIENT)
Age: 64
End: 2024-05-24

## 2024-05-24 RX ORDER — LEFLUNOMIDE 20 MG/1
20 TABLET, FILM COATED ORAL
Qty: 30 | Refills: 3 | Status: ACTIVE | COMMUNITY
Start: 2022-07-18 | End: 1900-01-01

## 2024-06-03 ENCOUNTER — APPOINTMENT (OUTPATIENT)
Dept: RHEUMATOLOGY | Facility: CLINIC | Age: 64
End: 2024-06-03
Payer: COMMERCIAL

## 2024-06-03 VITALS
HEART RATE: 79 BPM | OXYGEN SATURATION: 98 % | DIASTOLIC BLOOD PRESSURE: 74 MMHG | HEIGHT: 60 IN | BODY MASS INDEX: 22.58 KG/M2 | RESPIRATION RATE: 16 BRPM | WEIGHT: 115 LBS | TEMPERATURE: 98.1 F | SYSTOLIC BLOOD PRESSURE: 110 MMHG

## 2024-06-03 DIAGNOSIS — M45.8 ANKYLOSING SPONDYLITIS SACRAL AND SACROCOCCYGEAL REGION: ICD-10-CM

## 2024-06-03 DIAGNOSIS — M54.2 CERVICALGIA: ICD-10-CM

## 2024-06-03 DIAGNOSIS — G56.03 CARPAL TUNNEL SYNDROM,BILATERAL UPPER LIMBS: ICD-10-CM

## 2024-06-03 DIAGNOSIS — M51.36 OTHER INTERVERTEBRAL DISC DEGENERATION, LUMBAR REGION: ICD-10-CM

## 2024-06-03 PROCEDURE — 99214 OFFICE O/P EST MOD 30 MIN: CPT

## 2024-06-03 PROCEDURE — G2211 COMPLEX E/M VISIT ADD ON: CPT

## 2024-06-03 NOTE — REASON FOR VISIT
[Follow-Up: _____] : a [unfilled] follow-up visit [FreeTextEntry1] : seeing spine surgeon in [facility]

## 2024-06-03 NOTE — HISTORY OF PRESENT ILLNESS
[FreeTextEntry1] : pt is visibly very thin, weight loss >> has hyperthyroidism, hx of benign brain tumor  lost weight   ps, HLAB27+ sacroiliitis on taltz, arava s/p many 2ndary Rx failures of biologics, large in part due to other types of pain  however, also w/ significant chronic pain >> going to [facility] for back pain  burning sensation in anterior tibia had injection to L spine and L hip  had mild allergy  Today she reports tomorrow has consult w/ Spine surgeon at [facility] b/c of lack of response to injection as well as abnormal Lspine imaging that is NOT c/w SpA but DDD and complications of this  she has tried and failed many non-surgical options     from prior: 61 yof with +HLAB27 non-radiographic SpA, PSA. also FMS, and significant other DDD, DJD, herniations of spine.   SZ activity as per neurology was given Rx for cosentyx in August 2020 but had to stop the medicine after 2 wks due to having brain surgery. Rx dose initially was 150mg last visit increased to 300mg dose which she is now tolerating Years ago when i met her based on SI tenderness, inflammatory back pain and +HLAB27 pt was given sx of sacroiliits/SpA she has hx of scalp ps as well - today we discuss SPA/PSA in detail though today she says she doesn't have psoriasis presently. last visit reported SEVERE HAND AND ELBOW PAIN. THIS IS ACCOMPANIED BY AM STIFFNESS IN PIP, MCP R HAND WORSE THAN L was given SQ MTX given these peripheral sx c/w PSA but she has not tolerated it also has FMS and chronic Back pain with DDD, DJD had R TKR at [facility] 2019 has persistent knee pain today reports medial elbow pain bilateral, plantar pain she takes gabapentin for FMS - hasn't tolerated several other Rx she is in the care of [facility], Pain Management She doesn't tolerate PO meds well after 2ndary failure on humira changed to cimzia then cosentyx recently started PT but w/o noticeable change yet

## 2024-06-03 NOTE — ASSESSMENT
[FreeTextEntry1] : pt is visibly very thin, weight loss >> has hyperthyroidism, hx of benign brain tumor  lost weight   ps, HLAB27+ sacroiliitis on taltz arava s/p many 2ndary Rx failures of biologics, large in part due to other types of pain  however, also w/ significant chronic pain >> going to [facility] for back pain  burning sensation in anterior tibia had injection to L spine and L hip  had mild allergy  Today she reports tomorrow has consult w/ Spine surgeon at [facility] b/c of lack of response to injection as well as abnormal Lspine imaging that is NOT c/w SpA but DDD and complications of this  she has tried and failed many non-surgical options >> inflammatory disease does seem controlled but ongoing above issues  >> fuv after consult w/ surgeon >> labs ordered to monitor disease activity and for medication side effects.  Will notify of results   pt requests lipids and TFTs  patient is aware of and in agreement with assessment and plans

## 2024-06-05 ENCOUNTER — TRANSCRIPTION ENCOUNTER (OUTPATIENT)
Age: 64
End: 2024-06-05

## 2024-06-05 LAB
25(OH)D3 SERPL-MCNC: 56.3 NG/ML
ALBUMIN SERPL ELPH-MCNC: 4.7 G/DL
ALP BLD-CCNC: 48 U/L
ALT SERPL-CCNC: 17 U/L
ANION GAP SERPL CALC-SCNC: 10 MMOL/L
AST SERPL-CCNC: 19 U/L
BASOPHILS # BLD AUTO: 0.03 K/UL
BASOPHILS NFR BLD AUTO: 0.5 %
BILIRUB SERPL-MCNC: 0.5 MG/DL
BUN SERPL-MCNC: 17 MG/DL
CALCIUM SERPL-MCNC: 9.7 MG/DL
CHLORIDE SERPL-SCNC: 106 MMOL/L
CHOLEST SERPL-MCNC: 129 MG/DL
CO2 SERPL-SCNC: 24 MMOL/L
CREAT SERPL-MCNC: 0.73 MG/DL
EGFR: 94 ML/MIN/1.73M2
EOSINOPHIL # BLD AUTO: 0.07 K/UL
EOSINOPHIL NFR BLD AUTO: 1.3 %
ERYTHROCYTE [SEDIMENTATION RATE] IN BLOOD BY WESTERGREN METHOD: 2 MM/HR
HCT VFR BLD CALC: 39.7 %
HDLC SERPL-MCNC: 65 MG/DL
HGB BLD-MCNC: 13.6 G/DL
IMM GRANULOCYTES NFR BLD AUTO: 0.2 %
LDLC SERPL CALC-MCNC: 45 MG/DL
LYMPHOCYTES # BLD AUTO: 2.4 K/UL
LYMPHOCYTES NFR BLD AUTO: 44 %
MAN DIFF?: NORMAL
MCHC RBC-ENTMCNC: 30.6 PG
MCHC RBC-ENTMCNC: 34.3 GM/DL
MCV RBC AUTO: 89.4 FL
MONOCYTES # BLD AUTO: 0.44 K/UL
MONOCYTES NFR BLD AUTO: 8.1 %
NEUTROPHILS # BLD AUTO: 2.51 K/UL
NEUTROPHILS NFR BLD AUTO: 45.9 %
NONHDLC SERPL-MCNC: 64 MG/DL
PLATELET # BLD AUTO: 187 K/UL
POTASSIUM SERPL-SCNC: 4.6 MMOL/L
PROT SERPL-MCNC: 7 G/DL
RBC # BLD: 4.44 M/UL
RBC # FLD: 12.4 %
SODIUM SERPL-SCNC: 141 MMOL/L
TRIGL SERPL-MCNC: 99 MG/DL
TSH SERPL-ACNC: 0.28 UIU/ML
WBC # FLD AUTO: 5.46 K/UL

## 2024-06-10 ENCOUNTER — APPOINTMENT (OUTPATIENT)
Dept: OTOLARYNGOLOGY | Facility: CLINIC | Age: 64
End: 2024-06-10

## 2024-06-24 ENCOUNTER — APPOINTMENT (OUTPATIENT)
Dept: PAIN MANAGEMENT | Facility: CLINIC | Age: 64
End: 2024-06-24

## 2024-06-25 ENCOUNTER — APPOINTMENT (OUTPATIENT)
Dept: ORTHOPEDIC SURGERY | Facility: CLINIC | Age: 64
End: 2024-06-25

## 2024-07-24 ENCOUNTER — APPOINTMENT (OUTPATIENT)
Dept: ENDOCRINOLOGY | Facility: CLINIC | Age: 64
End: 2024-07-24

## 2024-07-24 ENCOUNTER — APPOINTMENT (OUTPATIENT)
Dept: ENDOCRINOLOGY | Facility: CLINIC | Age: 64
End: 2024-07-24
Payer: COMMERCIAL

## 2024-07-24 VITALS
SYSTOLIC BLOOD PRESSURE: 122 MMHG | HEART RATE: 72 BPM | DIASTOLIC BLOOD PRESSURE: 82 MMHG | BODY MASS INDEX: 23.24 KG/M2 | WEIGHT: 119 LBS | OXYGEN SATURATION: 99 %

## 2024-07-24 DIAGNOSIS — E04.2 NONTOXIC MULTINODULAR GOITER: ICD-10-CM

## 2024-07-24 DIAGNOSIS — Z86.39 PERSONAL HISTORY OF OTHER ENDOCRINE, NUTRITIONAL AND METABOLIC DISEASE: ICD-10-CM

## 2024-07-24 DIAGNOSIS — M81.0 AGE-RELATED OSTEOPOROSIS W/OUT CURRENT PATHOLOGICAL FRACTURE: ICD-10-CM

## 2024-07-24 PROCEDURE — 96401 CHEMO ANTI-NEOPL SQ/IM: CPT

## 2024-07-24 PROCEDURE — 99214 OFFICE O/P EST MOD 30 MIN: CPT | Mod: 25

## 2024-07-24 RX ORDER — DENOSUMAB 60 MG/ML
60 INJECTION SUBCUTANEOUS
Qty: 1 | Refills: 0 | Status: COMPLETED | OUTPATIENT
Start: 2024-07-24

## 2024-07-24 RX ADMIN — DENOSUMAB 60 MG/ML: 60 INJECTION SUBCUTANEOUS at 00:00

## 2024-07-24 NOTE — END OF VISIT
[FreeTextEntry3] :  This note was written by Zuleyma Keller on (July 24, 2024) acting as a medical scribe for Dr. Stephens This note was authored by the medical scribe for me. I have reviewed, edited, and revised the note as needed. I am in agreement with the exam findings, imaging findings, and treatment plan.  Sudeep Stephens MD

## 2024-07-24 NOTE — HISTORY OF PRESENT ILLNESS
[Zoledronic Acid (Zometa)] : Zoledronic Acid [Denosumab (Prolia)] : Denosumab [FreeTextEntry1] :  Pt returns for a follow-up visit for osteoporosis and thyroid nodule. Pt restarted Prolia 4/2022. No interval health changes. No major surgeries, hospitalizations, fractures or changes in medication. Up to date with dentist. No major dental work planned.  Pt was first told of low bone density ~2013. Pt was being treated by PCP. She previously took an oral bisphosphonates and had severe UGI sx. She had endoscopies, no ulcers found. BMD 11/2017 vs 2014, spine: -2.8, osteoporosis ( -11.4%) Total hip: -2.5 osteoporosis (-9.6%) Fem neck: -2.8 osteoporosis (-6.8%) Proximal wrist: -1.1 osteopenia (-3.4%). Pt was then put on Prolia for 2 doses (last ~6/2018). She states on Prolia she has severe myalgias. No h/o fx. FHx of osteoporosis (mother and sister). Pt had hysterectomy at 42 due to fibroids and went through menopause ~ 52. No HRT. No hot flashes. She gets moderate amounts of dietary Ca and takes Ca. BMD 2/2019 indicates improvement in spine and tot hip, only fem neck consistent with osteoporosis (likely due to Prolia). Pt had first dose of IV Reclast 3/2019. Tolerated well. BMD 2/2020 indicates stable osteopenia in spine, total hip, and proximal radius; stable osteoporosis in femoral neck. Pt had another dose of IV Reclast 3/2021. Tolerated well. No thigh pain, no interval fx, no APR.04/ BMD 2022 decreased. Pt restarted Prolia 4/2022. Tolerating well. No ONJ or AFF. Ca WNL. BMD 5/2023 shows increased spine and stabilized hip. BMD results reviewed w/ pt.   Pt has fibromyalgia; previously on long term Prednisone for 6 mons in 2018.  Endocrine hx notable for R thyroid nodule, FNA biopsy benign with Dr. Shahriar Strauss. No dysphagia or neck pain. No sx of hyper or hypothyroidism.   Hx: Pt had RKR 6/2019. No complications. Pt still has some discomfort.   Patient previously stated that she went through a surgery for meningioma, the procedure went well w/o any complications.  - she reports occasionally taking calcium supplementation, denies taking vitamin D.she reports when she doesn't take calcium, her nails break more easily - endorses left hand tingling, left calf weakness - no milk or leafy greens in diet - reports right TMJ joint pain for over 1 year, can be bad or good - does not smoke cigarettes

## 2024-07-24 NOTE — ASSESSMENT
[Denosumab Therapy] : Risks  and benefits of denosumab therapy were discussed with the patient including eczema, cellulitis, osteonecrosis of the jaw and atypical femur fractures [FreeTextEntry1] : 62 y/o female returns for a follow-up visit for osteoporosis and thyroid nodule.   Pt has known of low bone density since ~ 2013. She was previously intolerant to oral bisphosphonates due to UGI sx. BMD 2017 decreased while on drug holiday. Pt then had 2 doses of Prolia (last ~6/2018). She did not tolerate due to severe myalgias. Pt has previously been on long term steroids and restarted Prednisone ~2/2019. BMD 2/2019 indicates improvement in spine and tot hip, only fem neck consistent with osteoporosis (likely due to Prolia). Pt had first dose of IV Reclast 3/2019. Tolerated well. BMD 2/2020 indicates stable osteopenia in spine, total hip, and proximal radius, stable osteoporosis in femoral neck. Pt had another dose of IV Reclast 3/2021. Tolerated well.  BMD 04/2022 decreased. Pt restarted Prolia 4/2022. Tolerating well. No ONJ or AFF. Ca WNL. BMD 5/2023 shows increased spine and stabilized hip. BMD results reviewed w/ pt. Continue Prolia from [facility].   Endocrine hx is notable for thyroid nodules, previously benign on biopsy. No local neck pain. No dysphagia or dysphonia. Exam palp left nodule. Thyroid US 07/2024 notable for bilateral nodules, appears generally stable vs prior. Pt clinically euthyroid, asympt. Observe.  Labs: June 2024 Creatinine: 0.73 Calcium: 9.7 Vitamin D: 56.3 TSH: 0.28  F/u in 6 months

## 2024-07-24 NOTE — PROCEDURE
[FreeTextEntry1] : Thyroid Ultrasound: 07/24/2024 Complex partially cystic right nodule: 15mm x 14mm x 17m  Second solid nodule: 10mm x 7mm x 7mm  Left dominant nodule: 22mm x 18mm x 29mm   Thyroid US 5/17/23 left thyroid nodule 20mm x 17mm x 36 mm  Right thyroid nodule partially cystic approximately 15mm x 16mm x 17 mm  Bone mineral density: 05/17/2023 indication: vs 2022 assess response to medication prior study showed rapid bone loss  spine -2.0 osteopenia +8.7% total hip -2.1 osteopenia, no significant change  femoral neck  -2.8 osteoporosis, no significant change  proximal radius -1.5 osteopenia, no significant change   Thyroid US 11/2022 left thyroid nodule 1.5 x  1.9 x 3.6cm   Right thyroid nodule approximately 16 X 15 X 19 mm  Bone mineral density: 04/06/2022  Indication: vs. 2020 Spine: (L1-L3) -2.5 osteoporosis, -5.8% Total hip: -2.3 osteopenia, no significant change Femoral neck: -2.9 osteoporosis, -5.4% Proximal radius: -1.7 osteopenia, no significant change  Thyroid ultrasound March 3, 2021 Right thyroid nodule:  15 x 15 x 20 mm nodule no prior for comparison  Bone mineral density: 02/04/2020  Indication: vs. 2019 Spine: -2.2 osteopenia, no significant change Total hip: -2.2 osteopenia, no significant change Femoral neck: -2.6 osteoporosis, no significant change Proximal radius: -1.4 osteopenia, no significant change  Bone mineral density: 02/21/2019  Indication: vs. 2017  Spine: -2.2 osteopenia (+8.0%) Total hip: -2.3 osteopenia (+4.9%) Femoral neck: -2.6 osteoporosis, no significant change  Proximal radius: -1.3 osteopenia, no significant change

## 2024-07-24 NOTE — PHYSICAL EXAM
[Alert] : alert [Healthy Appearance] : healthy appearance [No Acute Distress] : no acute distress [Well Developed] : well developed [Normal Sclera/Conjunctiva] : normal sclera/conjunctiva [No Proptosis] : no proptosis [Normal Lips/Gums] : the lips and gums were normal [Normal Oropharynx] : the oropharynx was normal [No Neck Mass] : no neck mass was observed [No Respiratory Distress] : no respiratory distress [Clear to Auscultation] : lungs were clear to auscultation bilaterally [Not Tender] : non-tender [Soft] : abdomen soft [No Stigmata of Cushings Syndrome] : no stigmata of Cushings Syndrome [No Involuntary Movements] : no involuntary movements were seen [No Tremors] : no tremors [Normal Affect] : the affect was normal [Normal Mood] : the mood was normal [Abdominal Striae] : no abdominal striae [Acanthosis Nigricans] : no acanthosis nigricans [Acne] : no acne [de-identified] : Bilateral goiter L>R

## 2024-08-05 ENCOUNTER — APPOINTMENT (OUTPATIENT)
Dept: OBGYN | Facility: CLINIC | Age: 64
End: 2024-08-05

## 2024-08-27 ENCOUNTER — APPOINTMENT (OUTPATIENT)
Dept: OPHTHALMOLOGY | Facility: CLINIC | Age: 64
End: 2024-08-27
Payer: COMMERCIAL

## 2024-08-27 ENCOUNTER — NON-APPOINTMENT (OUTPATIENT)
Age: 64
End: 2024-08-27

## 2024-08-27 PROCEDURE — 92004 COMPRE OPH EXAM NEW PT 1/>: CPT

## 2024-08-27 PROCEDURE — 92250 FUNDUS PHOTOGRAPHY W/I&R: CPT

## 2024-09-03 ENCOUNTER — RX RENEWAL (OUTPATIENT)
Age: 64
End: 2024-09-03

## 2024-09-16 ENCOUNTER — APPOINTMENT (OUTPATIENT)
Dept: CARDIOLOGY | Facility: CLINIC | Age: 64
End: 2024-09-16
Payer: COMMERCIAL

## 2024-09-16 ENCOUNTER — APPOINTMENT (OUTPATIENT)
Dept: OTOLARYNGOLOGY | Facility: CLINIC | Age: 64
End: 2024-09-16
Payer: COMMERCIAL

## 2024-09-16 ENCOUNTER — NON-APPOINTMENT (OUTPATIENT)
Age: 64
End: 2024-09-16

## 2024-09-16 VITALS
WEIGHT: 120 LBS | BODY MASS INDEX: 23.44 KG/M2 | OXYGEN SATURATION: 97 % | SYSTOLIC BLOOD PRESSURE: 122 MMHG | DIASTOLIC BLOOD PRESSURE: 70 MMHG | HEART RATE: 73 BPM

## 2024-09-16 VITALS
DIASTOLIC BLOOD PRESSURE: 80 MMHG | BODY MASS INDEX: 23.24 KG/M2 | TEMPERATURE: 98 F | SYSTOLIC BLOOD PRESSURE: 130 MMHG | WEIGHT: 119 LBS | HEART RATE: 76 BPM

## 2024-09-16 DIAGNOSIS — Z01.810 ENCOUNTER FOR PREPROCEDURAL CARDIOVASCULAR EXAMINATION: ICD-10-CM

## 2024-09-16 DIAGNOSIS — I35.1 NONRHEUMATIC AORTIC (VALVE) INSUFFICIENCY: ICD-10-CM

## 2024-09-16 DIAGNOSIS — J34.89 OTHER SPECIFIED DISORDERS OF NOSE AND NASAL SINUSES: ICD-10-CM

## 2024-09-16 DIAGNOSIS — Z78.9 OTHER SPECIFIED HEALTH STATUS: ICD-10-CM

## 2024-09-16 DIAGNOSIS — K14.6 GLOSSODYNIA: ICD-10-CM

## 2024-09-16 PROCEDURE — 93000 ELECTROCARDIOGRAM COMPLETE: CPT

## 2024-09-16 PROCEDURE — 99214 OFFICE O/P EST MOD 30 MIN: CPT | Mod: 25

## 2024-09-16 PROCEDURE — 31231 NASAL ENDOSCOPY DX: CPT

## 2024-09-16 PROCEDURE — G2211 COMPLEX E/M VISIT ADD ON: CPT | Mod: NC

## 2024-09-16 PROCEDURE — 99204 OFFICE O/P NEW MOD 45 MIN: CPT | Mod: 25

## 2024-09-16 RX ORDER — ROSUVASTATIN CALCIUM 20 MG/1
20 TABLET, FILM COATED ORAL DAILY
Refills: 0 | Status: ACTIVE | COMMUNITY

## 2024-09-16 RX ORDER — MUPIROCIN 20 MG/G
2 OINTMENT TOPICAL
Qty: 3 | Refills: 1 | Status: ACTIVE | COMMUNITY
Start: 2024-09-16 | End: 1900-01-01

## 2024-09-16 NOTE — ASSESSMENT
[FreeTextEntry1] : Patient 64-year-old with underlying psoriatic arthritis has intermittent bouts of some blistering of the lateral tongue as well as her nasal cavity that seem to resolve on their own.  She uses mupirocin on occasion.  Seems to be from exacerbations of her underlying autoimmune issue which we will discuss she will discuss further with her rheumatologist.  Endoscopically no lesions other than a deviated septum were noted oral examination shows some irritation in the right posterior lateral tongue could be from sharpness of her molar have encouraged her to see her dentist and have it filed down possibly.  Otherwise no further acute interventions indicated she was given a refill for her mupirocin to use as needed.

## 2024-09-16 NOTE — HISTORY OF PRESENT ILLNESS
[de-identified] : Patient comes in with burning of the tongue that comes and goes for years. She does have burning of the right side of the tongue with certain foods like nuts. Other times, the burning happens randomly. No visible lesions on the tongue.  She also feels that just inside the nostrils sis a dryness and itching. Sometimes she has crusting as well and puts mupirocin on the area that does help. She has a history of psoriatic arthritis. 
02-Aug-2023 19:00

## 2024-09-16 NOTE — END OF VISIT
[FreeTextEntry3] : I, Dr. Garvin personally performed the evaluation and management (E/M) services including all necessary procedures, for this new patient. That E/M includes conducting the clinically appropriate initial history &/or exam, assessing all conditions, and establishing the plan of care. Today, my RICCARDO, Kayla Burgos, was here to observe &/or participate in the visit & follow plan of care established by me.

## 2024-09-17 ENCOUNTER — TRANSCRIPTION ENCOUNTER (OUTPATIENT)
Age: 64
End: 2024-09-17

## 2024-09-17 LAB
ESTIMATED AVERAGE GLUCOSE: 91 MG/DL
HBA1C MFR BLD HPLC: 4.8 %

## 2024-09-18 LAB — APO LP(A) SERPL-MCNC: <9 NMOL/L

## 2024-09-19 ENCOUNTER — TRANSCRIPTION ENCOUNTER (OUTPATIENT)
Age: 64
End: 2024-09-19

## 2024-09-19 ENCOUNTER — APPOINTMENT (OUTPATIENT)
Dept: CARDIOLOGY | Facility: CLINIC | Age: 64
End: 2024-09-19
Payer: COMMERCIAL

## 2024-09-19 PROCEDURE — 93306 TTE W/DOPPLER COMPLETE: CPT

## 2024-09-19 NOTE — PHYSICAL EXAM
[TextEntry] : General: NAD Neck: no carotid bruits Cardiac: nl s1s2, RRR, no mrg, no JVD Lungs: CTAB. Normal respiratory effort Vascular: 2+ radial pulses bilaterally, 2+ PT pulses bilaterally Neuro: moves all extremities Psych: normal affect, normal mood

## 2024-09-19 NOTE — HISTORY OF PRESENT ILLNESS
[FreeTextEntry1] : Ms. NICK is a 64 year female presenting for general cardiac evaluation and preoperative cardiac risk stratification for knee replacement surgery.   Notable PMHx: - Mild AI - HLD - Hypothyroidism - Psoriasis - Fibromyalgia - Right temporal meningioma s/p resection - Never Smoker - Family history of premature CAD (brother 40s, uncle/grandfather as well) - 3 prior pregnancies: 1 miscarriage, 2 alive and healthy; complicated by prolonged hospitalization related to ?bleeding issues  HPI: She reports recent arthroscopy for right knee and ongoing knee pain for which she is undergoing knee replacement workup at [facility] with Dr. Torres Willams. Despite her knee pain, she has reasonably good functional status limited by knee pain. She is able to clean her house, scrub tubs, clean toilets, vacuum, etc. Has 12 steps that she is able to go up and down without chest pain or dyspnea. Walks daily about 30 mins everyday. No chest pain or dyspnea with this. No issues with prior surgeries and anesthesia. Denies chest pain and dyspnea.   Social Hx: never smoker, no alcohol use, no drug use, lives with family at home   Data Review: Labs - 9/16/24: lp(a) <9 EKG - 9/16/2024: SR. iRBBB - 7/26/2021: SR. iRBBB. Echo - 9/19/2024: LVEF 63%, no rwma, nl RV, mild AI. - 8/19/2021: LVEF 63%, no RWMA, nl RV, mild AI. Personally performed strain which was normal at -22.8% Stress Tests - 4/27/2022 Pharm Nuc: Normal. Cath - no prior

## 2024-09-19 NOTE — HISTORY OF PRESENT ILLNESS
[FreeTextEntry1] : Ms. NICK is a 64 year female presenting for general cardiac evaluation and preoperative cardiac risk stratification for knee replacement surgery.   Notable PMHx: - Mild AI - HLD - Hypothyroidism - Psoriasis - Fibromyalgia - Right temporal meningioma s/p resection - Never Smoker - Family history of premature CAD (brother 40s, uncle/grandfather as well) - 3 prior pregnancies: 1 miscarriage, 2 alive and healthy; complicated by prolonged hospitalization related to ?bleeding issues  HPI: She reports recent arthroscopy for right knee and ongoing knee pain for which she is undergoing knee replacement workup at [facility] with Dr. Torres Willams. Despite her knee pain, she has reasonably good functional status limited by knee pain. She is able to clean her house, scrub tubs, clean toilets, vacuum, etc. Has 12 steps that she is able to go up and down without chest pain or dyspnea. Walks daily about 30 mins everyday. No chest pain or dyspnea with this. No issues with prior surgeries and anesthesia. Denies chest pain and dyspnea.   Social Hx: never smoker, no alcohol use, no drug use, lives with family at home   Data Review: Labs - 9/16/24: lp(a) <9 EKG - 9/16/2024: SR. iRBBB - 7/26/2021: SR. iRBBB. Echo - 9/19/2024: LVEF 63%, no rwma, nl RV, mild AI. - 8/19/2021: LVEF 63%, no RWMA, nl RV, mild AI. Personally performed strain which was normal at -22.8% Stress Tests - 4/27/2022 Pharm Nuc: Normal. Cath - no prior

## 2024-09-19 NOTE — DISCUSSION/SUMMARY
[EKG obtained to assist in diagnosis and management of assessed problem(s)] : EKG obtained to assist in diagnosis and management of assessed problem(s) [FreeTextEntry1] : Here for preoperative cardiac risk stratification for potential knee replacement surgery along with general cardiovascular checkup.  She has good functional status tolerating >4 METs despite her knee pain, well-controlled cholesterol, prior echocardiogram in 2021 with normal LV and RV function though noted mild aortic regurgitation.  She had a prior negative pharmacologic nuclear stress test in April 2022. TTE shows normal LV and RV function and stable mild AI. Her RCRI is 0 points and she will be at acceptable risk for knee replacement surgery should her TTE show no significant abnormalities.   # HLD # Family History of Premature CAD - continue crestor 20mg qd   # Preoperative Cardiac Risk Stratification  # Mild AI - AI stable on TTE and shows normal LV/RV function - acceptable cardiac risk for orthopedic surgery  CC Dr. Torres Willams at [facility] (Fax [phone])  RTC in 1 year or sooner PRN

## 2024-09-19 NOTE — DISCUSSION/SUMMARY
[EKG obtained to assist in diagnosis and management of assessed problem(s)] : EKG obtained to assist in diagnosis and management of assessed problem(s) [FreeTextEntry1] : Here for preoperative cardiac risk stratification for potential knee replacement surgery along with general cardiovascular checkup.  She has good functional status tolerating >4 METs despite her knee pain, well-controlled cholesterol, prior echocardiogram in 2021 with normal LV and RV function though noted mild aortic regurgitation.  She had a prior negative pharmacologic nuclear stress test in April 2022. TTE shows normal LV and RV function and stable mild AI. Her RCRI is 0 points and she will be at acceptable risk for knee replacement surgery should her TTE show no significant abnormalities.   # HLD # Family History of Premature CAD - continue crestor 20mg qd   # Preoperative Cardiac Risk Stratification  # Mild AI - AI stable on TTE and shows normal LV/RV function - acceptable cardiac risk for orthopedic surgery  CC Dr. Torres Willams at [facility] (Fax [phone])  RTC in 1 year or sooner PRN

## 2024-09-26 ENCOUNTER — NON-APPOINTMENT (OUTPATIENT)
Age: 64
End: 2024-09-26

## 2024-10-07 ENCOUNTER — APPOINTMENT (OUTPATIENT)
Dept: OBGYN | Facility: CLINIC | Age: 64
End: 2024-10-07

## 2024-10-07 ENCOUNTER — APPOINTMENT (OUTPATIENT)
Dept: RHEUMATOLOGY | Facility: CLINIC | Age: 64
End: 2024-10-07
Payer: COMMERCIAL

## 2024-10-07 ENCOUNTER — APPOINTMENT (OUTPATIENT)
Dept: PAIN MANAGEMENT | Facility: CLINIC | Age: 64
End: 2024-10-07
Payer: COMMERCIAL

## 2024-10-07 ENCOUNTER — APPOINTMENT (OUTPATIENT)
Dept: RHEUMATOLOGY | Facility: CLINIC | Age: 64
End: 2024-10-07

## 2024-10-07 VITALS
HEART RATE: 75 BPM | OXYGEN SATURATION: 95 % | BODY MASS INDEX: 23.75 KG/M2 | HEIGHT: 60 IN | WEIGHT: 121 LBS | DIASTOLIC BLOOD PRESSURE: 80 MMHG | SYSTOLIC BLOOD PRESSURE: 125 MMHG

## 2024-10-07 VITALS
WEIGHT: 118 LBS | SYSTOLIC BLOOD PRESSURE: 116 MMHG | BODY MASS INDEX: 23.16 KG/M2 | HEART RATE: 75 BPM | HEIGHT: 60 IN | DIASTOLIC BLOOD PRESSURE: 73 MMHG

## 2024-10-07 PROCEDURE — 99215 OFFICE O/P EST HI 40 MIN: CPT

## 2024-10-07 PROCEDURE — 99214 OFFICE O/P EST MOD 30 MIN: CPT

## 2024-10-07 PROCEDURE — 99459 PELVIC EXAMINATION: CPT | Mod: NC

## 2024-10-07 PROCEDURE — 99396 PREV VISIT EST AGE 40-64: CPT

## 2024-10-07 PROCEDURE — G2211 COMPLEX E/M VISIT ADD ON: CPT | Mod: NC

## 2024-10-07 RX ORDER — NALTREXONE HCL 100 %
POWDER (GRAM) MISCELLANEOUS
Qty: 30 | Refills: 5 | Status: ACTIVE | COMMUNITY
Start: 2024-10-07 | End: 1900-01-01

## 2024-10-07 RX ORDER — TRAMADOL HYDROCHLORIDE 50 MG/1
50 TABLET, COATED ORAL
Qty: 60 | Refills: 0 | Status: ACTIVE | COMMUNITY
Start: 2024-10-07 | End: 1900-01-01

## 2024-10-09 LAB
AMPHET UR-MCNC: NEGATIVE NG/ML
BARBITURATES UR-MCNC: NEGATIVE NG/ML
BENZODIAZ UR-MCNC: NEGATIVE NG/ML
COCAINE METAB.OTHER UR-MCNC: NEGATIVE NG/ML
CREATININE, URINE: 178.4 MG/DL
FENTANYL, URINE: NEGATIVE NG/ML
METHADONE SCREEN, UR: NEGATIVE NG/ML
OPIATES UR-MCNC: NEGATIVE NG/ML
OXYCODONE/OXYMORPHONE, URINE: NEGATIVE NG/ML
PCP UR-MCNC: NEGATIVE NG/ML
PH, URINE: 6.3
THC UR QL: NEGATIVE NG/ML

## 2024-10-10 ENCOUNTER — TRANSCRIPTION ENCOUNTER (OUTPATIENT)
Age: 64
End: 2024-10-10

## 2024-10-10 LAB
ALBUMIN SERPL ELPH-MCNC: 4.8 G/DL
ALP BLD-CCNC: 45 U/L
ALT SERPL-CCNC: 12 U/L
ANION GAP SERPL CALC-SCNC: 11 MMOL/L
AST SERPL-CCNC: 19 U/L
BASOPHILS # BLD AUTO: 0.03 K/UL
BASOPHILS NFR BLD AUTO: 0.6 %
BILIRUB SERPL-MCNC: 0.4 MG/DL
BUN SERPL-MCNC: 21 MG/DL
CALCIUM SERPL-MCNC: 9.4 MG/DL
CHLORIDE SERPL-SCNC: 106 MMOL/L
CHOLEST SERPL-MCNC: 149 MG/DL
CO2 SERPL-SCNC: 26 MMOL/L
CREAT SERPL-MCNC: 0.7 MG/DL
CRP SERPL-MCNC: <3 MG/L
EGFR: 98 ML/MIN/1.73M2
EOSINOPHIL # BLD AUTO: 0.06 K/UL
EOSINOPHIL NFR BLD AUTO: 1.3 %
ERYTHROCYTE [SEDIMENTATION RATE] IN BLOOD BY WESTERGREN METHOD: 3 MM/HR
ESTIMATED AVERAGE GLUCOSE: 94 MG/DL
GLUCOSE SERPL-MCNC: 96 MG/DL
HBA1C MFR BLD HPLC: 4.9 %
HCT VFR BLD CALC: 40.5 %
HDLC SERPL-MCNC: 74 MG/DL
HGB BLD-MCNC: 13.2 G/DL
IMM GRANULOCYTES NFR BLD AUTO: 0.2 %
LDLC SERPL CALC-MCNC: 58 MG/DL
LYMPHOCYTES # BLD AUTO: 2.05 K/UL
LYMPHOCYTES NFR BLD AUTO: 42.8 %
MAN DIFF?: NORMAL
MCHC RBC-ENTMCNC: 29.8 PG
MCHC RBC-ENTMCNC: 32.6 GM/DL
MCV RBC AUTO: 91.4 FL
MONOCYTES # BLD AUTO: 0.41 K/UL
MONOCYTES NFR BLD AUTO: 8.6 %
NEUTROPHILS # BLD AUTO: 2.23 K/UL
NEUTROPHILS NFR BLD AUTO: 46.5 %
NONHDLC SERPL-MCNC: 75 MG/DL
PLATELET # BLD AUTO: 186 K/UL
POTASSIUM SERPL-SCNC: 4.5 MMOL/L
PROT SERPL-MCNC: 7 G/DL
RBC # BLD: 4.43 M/UL
RBC # FLD: 12.7 %
SODIUM SERPL-SCNC: 143 MMOL/L
TRIGL SERPL-MCNC: 95 MG/DL
WBC # FLD AUTO: 4.79 K/UL

## 2024-10-11 LAB
O-DESMETHYLTRAMADOL: NEGATIVE NG/ML
TRAMADOL UR-MCNC: NEGATIVE NG/ML

## 2024-10-16 ENCOUNTER — APPOINTMENT (OUTPATIENT)
Dept: ORTHOPEDIC SURGERY | Facility: CLINIC | Age: 64
End: 2024-10-16

## 2024-10-21 ENCOUNTER — NON-APPOINTMENT (OUTPATIENT)
Age: 64
End: 2024-10-21

## 2024-10-21 ENCOUNTER — OUTPATIENT (OUTPATIENT)
Dept: OUTPATIENT SERVICES | Facility: HOSPITAL | Age: 64
LOS: 1 days | End: 2024-10-21

## 2024-10-21 ENCOUNTER — APPOINTMENT (OUTPATIENT)
Dept: INTERNAL MEDICINE | Facility: CLINIC | Age: 64
End: 2024-10-21

## 2024-10-21 DIAGNOSIS — K46.9 UNSPECIFIED ABDOMINAL HERNIA WITHOUT OBSTRUCTION OR GANGRENE: Chronic | ICD-10-CM

## 2024-11-04 ENCOUNTER — APPOINTMENT (OUTPATIENT)
Dept: RHEUMATOLOGY | Facility: CLINIC | Age: 64
End: 2024-11-04
Payer: COMMERCIAL

## 2024-11-04 ENCOUNTER — APPOINTMENT (OUTPATIENT)
Dept: ULTRASOUND IMAGING | Facility: CLINIC | Age: 64
End: 2024-11-04
Payer: COMMERCIAL

## 2024-11-04 ENCOUNTER — APPOINTMENT (OUTPATIENT)
Dept: MAMMOGRAPHY | Facility: CLINIC | Age: 64
End: 2024-11-04
Payer: COMMERCIAL

## 2024-11-04 PROCEDURE — 76641 ULTRASOUND BREAST COMPLETE: CPT | Mod: 50

## 2024-11-04 PROCEDURE — 77063 BREAST TOMOSYNTHESIS BI: CPT

## 2024-11-04 PROCEDURE — 77067 SCR MAMMO BI INCL CAD: CPT

## 2024-11-04 PROCEDURE — 99442: CPT

## 2024-11-14 ENCOUNTER — APPOINTMENT (OUTPATIENT)
Dept: CARDIOLOGY | Facility: CLINIC | Age: 64
End: 2024-11-14

## 2024-11-18 ENCOUNTER — NON-APPOINTMENT (OUTPATIENT)
Age: 64
End: 2024-11-18

## 2024-11-18 ENCOUNTER — APPOINTMENT (OUTPATIENT)
Dept: CARDIOLOGY | Facility: CLINIC | Age: 64
End: 2024-11-18
Payer: COMMERCIAL

## 2024-11-18 VITALS
SYSTOLIC BLOOD PRESSURE: 122 MMHG | WEIGHT: 120 LBS | HEART RATE: 70 BPM | HEIGHT: 60 IN | BODY MASS INDEX: 23.56 KG/M2 | DIASTOLIC BLOOD PRESSURE: 80 MMHG

## 2024-11-18 DIAGNOSIS — Z01.810 ENCOUNTER FOR PREPROCEDURAL CARDIOVASCULAR EXAMINATION: ICD-10-CM

## 2024-11-18 DIAGNOSIS — I35.1 NONRHEUMATIC AORTIC (VALVE) INSUFFICIENCY: ICD-10-CM

## 2024-11-18 DIAGNOSIS — E78.00 PURE HYPERCHOLESTEROLEMIA, UNSPECIFIED: ICD-10-CM

## 2024-11-18 PROCEDURE — G2211 COMPLEX E/M VISIT ADD ON: CPT | Mod: NC

## 2024-11-18 PROCEDURE — 99214 OFFICE O/P EST MOD 30 MIN: CPT

## 2024-11-18 PROCEDURE — 93000 ELECTROCARDIOGRAM COMPLETE: CPT | Mod: NC
